# Patient Record
Sex: MALE | Race: WHITE | NOT HISPANIC OR LATINO | Employment: FULL TIME | URBAN - METROPOLITAN AREA
[De-identification: names, ages, dates, MRNs, and addresses within clinical notes are randomized per-mention and may not be internally consistent; named-entity substitution may affect disease eponyms.]

---

## 2018-01-13 NOTE — MISCELLANEOUS
Message  Return to work or school:        Patient had surgery on 7/25/16  He will be seen in the office for his follow up appointment on 8/9/16  Is returning to work on 8/1/16 with lifting restrictions for 4 weeks  No lifting greater than 15 lbs for week 1-2; No lifting greater than 25lbs for week 3-4  Madelyn Reyna MD/cs/lpn        Signatures   Electronically signed by : Jorje Ceja, ; Jul 29 2016 11:09AM EST                       (Author)    Electronically signed by : Madelyn Reyna MD; Aug  2 2016 10:08AM EST                       (Author)

## 2018-01-17 NOTE — MISCELLANEOUS
Message  Return to work or school:   Lynch [place] is under my professional care  He was seen in my office on 08/09/2016   He is able to return to work on  08/22/2016      Justa Narayanan is able to return to work without any restrictions on 8/22/2016          Signatures   Electronically signed by : Parveen Salas OM; Aug 18 2016  9:52AM EST                       (Author)

## 2019-09-23 ENCOUNTER — OFFICE VISIT (OUTPATIENT)
Dept: FAMILY MEDICINE CLINIC | Facility: CLINIC | Age: 54
End: 2019-09-23
Payer: COMMERCIAL

## 2019-09-23 VITALS
WEIGHT: 160.6 LBS | BODY MASS INDEX: 26.76 KG/M2 | HEIGHT: 65 IN | OXYGEN SATURATION: 97 % | HEART RATE: 64 BPM | DIASTOLIC BLOOD PRESSURE: 70 MMHG | SYSTOLIC BLOOD PRESSURE: 110 MMHG

## 2019-09-23 DIAGNOSIS — R39.198 URINARY STREAM SLOWING: ICD-10-CM

## 2019-09-23 DIAGNOSIS — Z13.220 LIPID SCREENING: ICD-10-CM

## 2019-09-23 DIAGNOSIS — Z12.5 SCREENING PSA (PROSTATE SPECIFIC ANTIGEN): ICD-10-CM

## 2019-09-23 DIAGNOSIS — Z00.00 WELL ADULT HEALTH CHECK: Primary | ICD-10-CM

## 2019-09-23 DIAGNOSIS — R19.8 RECTAL DISCHARGE: ICD-10-CM

## 2019-09-23 DIAGNOSIS — Z12.11 COLON CANCER SCREENING: ICD-10-CM

## 2019-09-23 PROCEDURE — 99396 PREV VISIT EST AGE 40-64: CPT | Performed by: FAMILY MEDICINE

## 2019-09-23 NOTE — PATIENT INSTRUCTIONS
Overall healthy 48year-old  He is not up to date with Lipid screening  Cholesterol 2015  183 with HDL 60, , await redo  He is not up to date with Diabetes screening  Await fasting blood work  There is no immunization history on file for this patient  He does not do yearly Flu shot  Tdap/tetanus shot is up to date  (done every 10 yrs for superficial cuts, every 5 yrs for deep wounds)    Rcvd about 1 yr ago w work injury      Was never a smoker  Regarding Colon Cancer screening, we discussed Colonoscopy vs ColoGuard is indicated starting at age 36-53  (Refer to Colorectal to evaluate regarding rectal discharge/ rectal protrusion present x years, also needs screening colonoscopy )    Discussed Prostate Cancer screening pros/cons starting at age 48  Has some urinary slowing daytime w urgency-   No nocturnal c/o  PSA blood test  ordered  Continue to try to watch healthy diet, exercise routinely  Hx Cervical radiculopathy ( LUE)   Continue w stretching/ exercise - call if worsens  We will see him again in 12 months, sooner as needed

## 2019-09-23 NOTE — PROGRESS NOTES
FAMILY PRACTICE OFFICE VISIT  [facility]ial[facility]ILLAN[facility]  [facility]  [id number]   [address] [place], [address]      NAME: Viri Cardenas  AGE: 48 y o  SEX: male  : 1965   MRN: [id number]    DATE: 2019  TIME: 2:22 PM    Assessment and Plan     Problem List Items Addressed This Visit        Other    Chronic Rectal discharge    Relevant Orders    Comprehensive metabolic panel    Ambulatory referral to Colorectal Surgery    CBC    Urinary stream slowing    Relevant Orders    Comprehensive metabolic panel    Urinalysis with microscopic      Other Visit Diagnoses     Well adult health check    -  Primary    Screening PSA (prostate specific antigen)        Relevant Orders    PSA, Total Screen    Colon cancer screening        Relevant Orders    Ambulatory referral to Colorectal Surgery    Lipid screening        Relevant Orders    Lipid panel          Patient Instructions   Overall healthy 48year-old  He is not up to date with Lipid screening  Cholesterol   183 with HDL 60, , await redo  He is not up to date with Diabetes screening  Await fasting blood work  There is no immunization history on file for this patient  He does not do yearly Flu shot  Tdap/tetanus shot is up to date  (done every 10 yrs for superficial cuts, every 5 yrs for deep wounds)    Rcvd about 1 yr ago w work injury      Was never a smoker  Regarding Colon Cancer screening, we discussed Colonoscopy vs ColoGuard is indicated starting at age 36-53  (Refer to Colorectal to evaluate regarding rectal discharge/ rectal protrusion present x years, also needs screening colonoscopy )    Discussed Prostate Cancer screening pros/cons starting at age 48  Has some urinary slowing daytime w urgency-   No nocturnal c/o  PSA blood test  ordered  Continue to try to watch healthy diet, exercise routinely      Hx Cervical radiculopathy ( LUE)   Continue w stretching/ exercise - call if worsens  We will see him again in 12 months, sooner as needed  Chief Complaint     Chief Complaint   Patient presents with    Physical Exam       History of Present Illness   Javier Villavicencio is a 48y o -year-old male who I had last seen back in 2015, he had been having some cervical radicular complaints at that time  He did undergo appendectomy back in 2016  Overall feels well -   Exercises at gym at least weekly-   Very active w deliveries at work     Has noted rectal soilage with protrusion/possible hemorrhoid for many years, uses piece of tissue routinely, has never received prior treatment for this  Review of Systems   Review of Systems   Constitutional: Negative for appetite change, fatigue, fever and unexpected weight change  HENT: Negative for sore throat and trouble swallowing  Respiratory: Negative for cough, chest tightness and shortness of breath  Cardiovascular: Negative for chest pain, palpitations and leg swelling  Gastrointestinal: Negative for abdominal pain and blood in stool  No acid reflux     Has BM regularly - alternates hard to soft for years  Genitourinary: Negative for dysuria and hematuria  Decreased urinary stream with some urgency daytime, no nocturnal complaints  No ED  No dec libido   Musculoskeletal: Positive for back pain (occ low) and neck pain (see 2015 re LUE/ CSpine-  stable recently)  Neurological: Negative for dizziness, syncope, light-headedness and headaches  Hematological: Does not bruise/bleed easily  Psychiatric/Behavioral: Negative for behavioral problems and confusion         Active Problem List     Patient Active Problem List   Diagnosis    Cervical radiculopathy    Chronic Rectal discharge    Urinary stream slowing       Past Medical History:  Reviewed    Past Surgical History:  Reviewed    Family History:  Reviewed    Social History:  Reviewed    Objective     Vitals:    09/23/19 1321   BP: 110/70   BP Location: Left arm   Patient Position: Sitting   Cuff Size: Large   Pulse: 64   SpO2: 97%   Weight: 72 8 kg (160 lb 9 6 oz)   Height: 5' 4 9" (1 648 m)     Body mass index is 26 81 kg/m²  BP Readings from Last 3 Encounters:   09/23/19 110/70   08/09/16 122/72   07/26/16 116/69       Wt Readings from Last 3 Encounters:   09/23/19 72 8 kg (160 lb 9 6 oz)   08/09/16 72 6 kg (160 lb 0 6 oz)   07/25/16 71 kg (156 lb 8 4 oz)       Physical Exam   Constitutional: He is oriented to person, place, and time  He appears well-developed and well-nourished  No distress  HENT:   Mouth/Throat: Oropharynx is clear and moist  No oropharyngeal exudate  TM clear   Eyes: Conjunctivae are normal  No scleral icterus  Cardiovascular: Normal rate, regular rhythm and normal heart sounds  No murmur heard  No carotid bruit   Pulmonary/Chest: Effort normal and breath sounds normal  No respiratory distress  Abdominal: Soft  There is no tenderness  Genitourinary: Prostate normal  Rectal exam shows guaiac negative stool  Genitourinary Comments: Has inflamed red external area rectal   No other mass palpable   Musculoskeletal: He exhibits no edema  Lymphadenopathy:     He has no cervical adenopathy  Neurological: He is alert and oriented to person, place, and time  Psychiatric: He has a normal mood and affect  His behavior is normal        ALLERGIES:  No Known Allergies    Current Medications     No current outpatient medications on file  No current facility-administered medications for this visit               Orders Placed This Encounter   Procedures    Lipid panel    Comprehensive metabolic panel    PSA, Total Screen    Urinalysis with microscopic    CBC    Ambulatory referral to Colorectal Surgery         Gisella France DO

## 2019-10-07 ENCOUNTER — APPOINTMENT (OUTPATIENT)
Dept: LAB | Facility: CLINIC | Age: 54
End: 2019-10-07
Payer: COMMERCIAL

## 2019-10-07 DIAGNOSIS — Z12.5 SCREENING PSA (PROSTATE SPECIFIC ANTIGEN): ICD-10-CM

## 2019-10-07 LAB
ALBUMIN SERPL BCP-MCNC: 3.9 G/DL (ref 3.5–5)
ALP SERPL-CCNC: 74 U/L (ref 46–116)
ALT SERPL W P-5'-P-CCNC: 19 U/L (ref 12–78)
ANION GAP SERPL CALCULATED.3IONS-SCNC: 1 MMOL/L (ref 4–13)
AST SERPL W P-5'-P-CCNC: 17 U/L (ref 5–45)
BACTERIA UR QL AUTO: NORMAL /HPF
BILIRUB SERPL-MCNC: 0.36 MG/DL (ref 0.2–1)
BILIRUB UR QL STRIP: NEGATIVE
BUN SERPL-MCNC: 16 MG/DL (ref 5–25)
CALCIUM SERPL-MCNC: 9.4 MG/DL (ref 8.3–10.1)
CHLORIDE SERPL-SCNC: 107 MMOL/L (ref 100–108)
CHOLEST SERPL-MCNC: 172 MG/DL (ref 50–200)
CLARITY UR: CLEAR
CO2 SERPL-SCNC: 29 MMOL/L (ref 21–32)
COLOR UR: YELLOW
CREAT SERPL-MCNC: 0.94 MG/DL (ref 0.6–1.3)
ERYTHROCYTE [DISTWIDTH] IN BLOOD BY AUTOMATED COUNT: 12.7 % (ref 11.6–15.1)
GFR SERPL CREATININE-BSD FRML MDRD: 93 ML/MIN/1.73SQ M
GLUCOSE P FAST SERPL-MCNC: 94 MG/DL (ref 65–99)
GLUCOSE UR STRIP-MCNC: NEGATIVE MG/DL
HCT VFR BLD AUTO: 46.1 % (ref 36.5–49.3)
HDLC SERPL-MCNC: 55 MG/DL (ref 40–60)
HGB BLD-MCNC: 15.1 G/DL (ref 12–17)
HGB UR QL STRIP.AUTO: NEGATIVE
HYALINE CASTS #/AREA URNS LPF: NORMAL /LPF
KETONES UR STRIP-MCNC: NEGATIVE MG/DL
LDLC SERPL CALC-MCNC: 107 MG/DL (ref 0–100)
LEUKOCYTE ESTERASE UR QL STRIP: NEGATIVE
MCH RBC QN AUTO: 30.6 PG (ref 26.8–34.3)
MCHC RBC AUTO-ENTMCNC: 32.8 G/DL (ref 31.4–37.4)
MCV RBC AUTO: 93 FL (ref 82–98)
NITRITE UR QL STRIP: NEGATIVE
NON-SQ EPI CELLS URNS QL MICRO: NORMAL /HPF
NONHDLC SERPL-MCNC: 117 MG/DL
PH UR STRIP.AUTO: 6.5 [PH]
PLATELET # BLD AUTO: 241 THOUSANDS/UL (ref 149–390)
PMV BLD AUTO: 9.2 FL (ref 8.9–12.7)
POTASSIUM SERPL-SCNC: 4.6 MMOL/L (ref 3.5–5.3)
PROT SERPL-MCNC: 7.5 G/DL (ref 6.4–8.2)
PROT UR STRIP-MCNC: NEGATIVE MG/DL
PSA SERPL-MCNC: 0.7 NG/ML (ref 0–4)
RBC # BLD AUTO: 4.94 MILLION/UL (ref 3.88–5.62)
RBC #/AREA URNS AUTO: NORMAL /HPF
SODIUM SERPL-SCNC: 137 MMOL/L (ref 136–145)
SP GR UR STRIP.AUTO: 1.03 (ref 1–1.03)
TRIGL SERPL-MCNC: 50 MG/DL
UROBILINOGEN UR QL STRIP.AUTO: 0.2 E.U./DL
WBC # BLD AUTO: 4.49 THOUSAND/UL (ref 4.31–10.16)
WBC #/AREA URNS AUTO: NORMAL /HPF

## 2019-10-07 PROCEDURE — 80053 COMPREHEN METABOLIC PANEL: CPT | Performed by: FAMILY MEDICINE

## 2019-10-07 PROCEDURE — 80061 LIPID PANEL: CPT | Performed by: FAMILY MEDICINE

## 2019-10-07 PROCEDURE — G0103 PSA SCREENING: HCPCS

## 2019-10-07 PROCEDURE — 36415 COLL VENOUS BLD VENIPUNCTURE: CPT | Performed by: FAMILY MEDICINE

## 2019-10-07 PROCEDURE — 85027 COMPLETE CBC AUTOMATED: CPT | Performed by: FAMILY MEDICINE

## 2019-10-07 PROCEDURE — 81001 URINALYSIS AUTO W/SCOPE: CPT | Performed by: FAMILY MEDICINE

## 2019-10-28 PROBLEM — K62.9 ANAL LESION: Status: ACTIVE | Noted: 2019-10-28

## 2019-11-15 ENCOUNTER — TELEPHONE (OUTPATIENT)
Dept: GASTROENTEROLOGY | Facility: HOSPITAL | Age: 54
End: 2019-11-15

## 2019-11-17 ENCOUNTER — ANESTHESIA (OUTPATIENT)
Dept: ANESTHESIOLOGY | Facility: HOSPITAL | Age: 54
End: 2019-11-17

## 2019-11-17 ENCOUNTER — ANESTHESIA EVENT (OUTPATIENT)
Dept: GASTROENTEROLOGY | Facility: HOSPITAL | Age: 54
End: 2019-11-17

## 2019-11-17 ENCOUNTER — ANESTHESIA EVENT (OUTPATIENT)
Dept: ANESTHESIOLOGY | Facility: HOSPITAL | Age: 54
End: 2019-11-17

## 2019-11-18 ENCOUNTER — ANESTHESIA (OUTPATIENT)
Dept: GASTROENTEROLOGY | Facility: HOSPITAL | Age: 54
End: 2019-11-18

## 2019-11-18 ENCOUNTER — HOSPITAL ENCOUNTER (OUTPATIENT)
Dept: GASTROENTEROLOGY | Facility: HOSPITAL | Age: 54
Setting detail: OUTPATIENT SURGERY
Discharge: HOME | End: 2019-11-18
Attending: COLON & RECTAL SURGERY | Admitting: COLON & RECTAL SURGERY
Payer: COMMERCIAL

## 2019-11-18 VITALS
DIASTOLIC BLOOD PRESSURE: 69 MMHG | SYSTOLIC BLOOD PRESSURE: 113 MMHG | HEART RATE: 56 BPM | TEMPERATURE: 98.6 F | RESPIRATION RATE: 16 BRPM | OXYGEN SATURATION: 98 %

## 2019-11-18 DIAGNOSIS — R19.8 RECTAL DISCHARGE: ICD-10-CM

## 2019-11-18 PROCEDURE — 45380 COLONOSCOPY AND BIOPSY: CPT | Performed by: COLON & RECTAL SURGERY

## 2019-11-18 PROCEDURE — 88305 TISSUE EXAM BY PATHOLOGIST: CPT | Performed by: PATHOLOGY

## 2019-11-18 RX ORDER — PROPOFOL 10 MG/ML
INJECTION, EMULSION INTRAVENOUS CONTINUOUS PRN
Status: DISCONTINUED | OUTPATIENT
Start: 2019-11-18 | End: 2019-11-18 | Stop reason: SURG

## 2019-11-18 RX ORDER — SODIUM CHLORIDE 9 MG/ML
100 INJECTION, SOLUTION INTRAVENOUS CONTINUOUS
Status: DISCONTINUED | OUTPATIENT
Start: 2019-11-18 | End: 2019-11-22 | Stop reason: HOSPADM

## 2019-11-18 RX ORDER — SODIUM CHLORIDE 9 MG/ML
INJECTION, SOLUTION INTRAVENOUS CONTINUOUS PRN
Status: DISCONTINUED | OUTPATIENT
Start: 2019-11-18 | End: 2019-11-18 | Stop reason: SURG

## 2019-11-18 RX ORDER — PROPOFOL 10 MG/ML
INJECTION, EMULSION INTRAVENOUS AS NEEDED
Status: DISCONTINUED | OUTPATIENT
Start: 2019-11-18 | End: 2019-11-18 | Stop reason: SURG

## 2019-11-18 RX ADMIN — SODIUM CHLORIDE: 9 INJECTION, SOLUTION INTRAVENOUS at 11:21

## 2019-11-18 RX ADMIN — PROPOFOL 80 MG: 10 INJECTION, EMULSION INTRAVENOUS at 11:25

## 2019-11-18 RX ADMIN — SODIUM CHLORIDE 100 ML/HR: 0.9 INJECTION, SOLUTION INTRAVENOUS at 10:45

## 2019-11-18 RX ADMIN — PROPOFOL 80 MCG/KG/MIN: 10 INJECTION, EMULSION INTRAVENOUS at 11:25

## 2019-11-18 RX ADMIN — PROPOFOL 20 MG: 10 INJECTION, EMULSION INTRAVENOUS at 11:30

## 2019-11-18 NOTE — H&P
History and Physical   Colon and Rectal Surgery   Jory Nielsen 48 y o  male MRN: [id number]  Unit/Bed#:  Encounter: [id number]  11/18/19   @NOW    No chief complaint on file  History of Present Illness   HPI:  Jory Nielsen is a 48 y o  male who presents for colorectal cancer screening  No recent exams  Patient has been seen in the outpatient setting for evaluation of mucosal prolapse with ectropion  He is scheduled for surgery later this week  Historical Information   Past Medical History:   Diagnosis Date    Hemorrhoids     Renal calculi      Past Surgical History:   Procedure Laterality Date    APPENDECTOMY      [place] LAP,APPENDECTOMY N/A 7/25/2016    Procedure: APPENDECTOMY LAPAROSCOPIC;  Surgeon: Bernardo Stiles MD;  Location: [facility];  Service: General       Meds/Allergies       (Not in a hospital admission)    No current outpatient medications on file  No Known Allergies      Social History   Social History     Substance and Sexual Activity   Alcohol Use No     Social History     Substance and Sexual Activity   Drug Use No     Social History     Tobacco Use   Smoking Status Never Smoker   Smokeless Tobacco Never Used         Family History:   Family History   Problem Relation Age of Onset    Cancer Paternal Grandfather         Unknown origin     Other Family         Back problem     Lung cancer Father     Cancer Maternal Grandfather         unknown origin         Objective     Current Vitals:   Blood Pressure: 138/81 (11/18/19 1032)  Pulse: 69 (11/18/19 1032)  Temperature: 98 6 °F (37 °C) (11/18/19 1032)  Temp Source: Oral (11/18/19 1032)  Respirations: 16 (11/18/19 1032)  SpO2: 97 % (11/18/19 1032)  No intake or output data in the 24 hours ending 11/18/19 1044    Physical Exam:  General:  Resting comfortably in bed   Eyes:Sclera anicteric  ENT: Trachea midline  Pulm:  Symmetric chest raise    No respiratory Distress  CV:  Regular on monitor  Abdomen:  Soft NT ND  Extremities:  No clubbing/ cyanosis/ edema    Lab Results: I have personally reviewed pertinent lab results  Imaging: I have personally reviewed pertinent reports  ASSESSMENT:  Luz Maria Trejo is a 48 y o  male who presents for outpatient colonoscopy  PLAN:  For colonoscopy    Risks/ Benefits reviewed to include but not limited to anesthesia, bleeding, missed lesions, and colonoscopic perforation requiring surgery

## 2019-11-18 NOTE — ANESTHESIA PREPROCEDURE EVALUATION
Review of Systems/Medical History  Patient summary reviewed  Chart reviewed  No history of anesthetic complications     Cardiovascular  Negative cardio ROS Exercise tolerance (METS): >4,     Pulmonary  Negative pulmonary ROS        GI/Hepatic    Bowel prep       Negative  ROS        Endo/Other  Negative endo/other ROS      GYN       Hematology  Negative hematology ROS      Musculoskeletal  Negative musculoskeletal ROS        Neurology  Negative neurology ROS      Psychology   Negative psychology ROS              Physical Exam    Airway    Mallampati score: I  TM Distance: >3 FB  Neck ROM: full     Dental   No notable dental hx     Cardiovascular  Comment: Negative ROS,     Pulmonary      Other Findings        Anesthesia Plan  ASA Score- 1     Anesthesia Type- IV sedation with anesthesia with ASA Monitors  Additional Monitors:   Airway Plan:         Plan Factors-    Induction- intravenous  Postoperative Plan-     Informed Consent- Anesthetic plan and risks discussed with patient and spouse  I personally reviewed this patient with the CRNA  Discussed and agreed on the Anesthesia Plan with the CRNA  Aleksandar Loza

## 2019-11-18 NOTE — ANESTHESIA POSTPROCEDURE EVALUATION
Post-Op Assessment Note    CV Status:  Stable    Pain management: adequate     Mental Status:  Alert and awake   Hydration Status:  Euvolemic   PONV Controlled:  Controlled   Airway Patency:  Patent   Post Op Vitals Reviewed: Yes      Staff: Anesthesiologist, CRNA           /73 (11/18/19 1149)    Temp      Pulse 73 (11/18/19 1149)   Resp 18 (11/18/19 1149)    SpO2 96 % (11/18/19 1149)

## 2019-11-18 NOTE — H&P (VIEW-ONLY)
History and Physical   Colon and Rectal Surgery   Efrem Connell 48 y o  male MRN: [id number]  Unit/Bed#:  Encounter: [id number]  11/18/19   @NOW    No chief complaint on file  History of Present Illness   HPI:  Efrem Connell is a 48 y o  male who presents for colorectal cancer screening  No recent exams  Patient has been seen in the outpatient setting for evaluation of mucosal prolapse with ectropion  He is scheduled for surgery later this week  Historical Information   Past Medical History:   Diagnosis Date    Hemorrhoids     Renal calculi      Past Surgical History:   Procedure Laterality Date    APPENDECTOMY      [place] LAP,APPENDECTOMY N/A 7/25/2016    Procedure: APPENDECTOMY LAPAROSCOPIC;  Surgeon: Jessica tS MD;  Location: [facility];  Service: General       Meds/Allergies       (Not in a hospital admission)    No current outpatient medications on file  No Known Allergies      Social History   Social History     Substance and Sexual Activity   Alcohol Use No     Social History     Substance and Sexual Activity   Drug Use No     Social History     Tobacco Use   Smoking Status Never Smoker   Smokeless Tobacco Never Used         Family History:   Family History   Problem Relation Age of Onset    Cancer Paternal Grandfather         Unknown origin     Other Family         Back problem     Lung cancer Father     Cancer Maternal Grandfather         unknown origin         Objective     Current Vitals:   Blood Pressure: 138/81 (11/18/19 1032)  Pulse: 69 (11/18/19 1032)  Temperature: 98 6 °F (37 °C) (11/18/19 1032)  Temp Source: Oral (11/18/19 1032)  Respirations: 16 (11/18/19 1032)  SpO2: 97 % (11/18/19 1032)  No intake or output data in the 24 hours ending 11/18/19 1044    Physical Exam:  General:  Resting comfortably in bed   Eyes:Sclera anicteric  ENT: Trachea midline  Pulm:  Symmetric chest raise    No respiratory Distress  CV:  Regular on monitor  Abdomen:  Soft NT ND  Extremities:  No clubbing/ cyanosis/ edema    Lab Results: I have personally reviewed pertinent lab results  Imaging: I have personally reviewed pertinent reports  ASSESSMENT:  Mandy Carnes is a 48 y o  male who presents for outpatient colonoscopy  PLAN:  For colonoscopy    Risks/ Benefits reviewed to include but not limited to anesthesia, bleeding, missed lesions, and colonoscopic perforation requiring surgery

## 2019-11-20 ENCOUNTER — ANESTHESIA EVENT (OUTPATIENT)
Dept: PERIOP | Facility: HOSPITAL | Age: 54
End: 2019-11-20
Payer: COMMERCIAL

## 2019-11-20 ENCOUNTER — HOSPITAL ENCOUNTER (OUTPATIENT)
Facility: HOSPITAL | Age: 54
Setting detail: OUTPATIENT SURGERY
Discharge: HOME | End: 2019-11-20
Attending: COLON & RECTAL SURGERY | Admitting: COLON & RECTAL SURGERY
Payer: COMMERCIAL

## 2019-11-20 ENCOUNTER — ANESTHESIA (OUTPATIENT)
Dept: PERIOP | Facility: HOSPITAL | Age: 54
End: 2019-11-20
Payer: COMMERCIAL

## 2019-11-20 VITALS
SYSTOLIC BLOOD PRESSURE: 142 MMHG | DIASTOLIC BLOOD PRESSURE: 76 MMHG | TEMPERATURE: 97.7 F | HEART RATE: 68 BPM | RESPIRATION RATE: 18 BRPM | BODY MASS INDEX: 27.49 KG/M2 | WEIGHT: 165 LBS | OXYGEN SATURATION: 99 % | HEIGHT: 65 IN

## 2019-11-20 DIAGNOSIS — R19.8 RECTAL DISCHARGE: Primary | ICD-10-CM

## 2019-11-20 DIAGNOSIS — R19.8 RECTAL DISCHARGE: ICD-10-CM

## 2019-11-20 PROCEDURE — C9290 INJ, BUPIVACAINE LIPOSOME: HCPCS | Performed by: COLON & RECTAL SURGERY

## 2019-11-20 PROCEDURE — 88304 TISSUE EXAM BY PATHOLOGIST: CPT | Performed by: PATHOLOGY

## 2019-11-20 PROCEDURE — 46255 REMOVE INT/EXT HEM 1 GROUP: CPT | Performed by: COLON & RECTAL SURGERY

## 2019-11-20 PROCEDURE — C1765 ADHESION BARRIER: HCPCS | Performed by: COLON & RECTAL SURGERY

## 2019-11-20 RX ORDER — MIDAZOLAM HYDROCHLORIDE 2 MG/2ML
INJECTION, SOLUTION INTRAMUSCULAR; INTRAVENOUS AS NEEDED
Status: DISCONTINUED | OUTPATIENT
Start: 2019-11-20 | End: 2019-11-20 | Stop reason: SURG

## 2019-11-20 RX ORDER — HYDROMORPHONE HCL/PF 1 MG/ML
0.2 SYRINGE (ML) INJECTION
Status: DISCONTINUED | OUTPATIENT
Start: 2019-11-20 | End: 2019-11-20 | Stop reason: HOSPADM

## 2019-11-20 RX ORDER — PROPOFOL 10 MG/ML
INJECTION, EMULSION INTRAVENOUS AS NEEDED
Status: DISCONTINUED | OUTPATIENT
Start: 2019-11-20 | End: 2019-11-20 | Stop reason: SURG

## 2019-11-20 RX ORDER — OXYCODONE HYDROCHLORIDE 5 MG/1
5 TABLET ORAL EVERY 4 HOURS PRN
Qty: 30 TABLET | Refills: 0 | Status: SHIPPED | OUTPATIENT
Start: 2019-11-20 | End: 2019-11-30

## 2019-11-20 RX ORDER — DEXAMETHASONE SODIUM PHOSPHATE 10 MG/ML
INJECTION, SOLUTION INTRAMUSCULAR; INTRAVENOUS AS NEEDED
Status: DISCONTINUED | OUTPATIENT
Start: 2019-11-20 | End: 2019-11-20 | Stop reason: SURG

## 2019-11-20 RX ORDER — BUPIVACAINE HYDROCHLORIDE AND EPINEPHRINE 2.5; 5 MG/ML; UG/ML
INJECTION, SOLUTION EPIDURAL; INFILTRATION; INTRACAUDAL; PERINEURAL AS NEEDED
Status: DISCONTINUED | OUTPATIENT
Start: 2019-11-20 | End: 2019-11-20 | Stop reason: HOSPADM

## 2019-11-20 RX ORDER — ONDANSETRON 2 MG/ML
INJECTION INTRAMUSCULAR; INTRAVENOUS AS NEEDED
Status: DISCONTINUED | OUTPATIENT
Start: 2019-11-20 | End: 2019-11-20 | Stop reason: SURG

## 2019-11-20 RX ORDER — SODIUM CHLORIDE, SODIUM LACTATE, POTASSIUM CHLORIDE, CALCIUM CHLORIDE 600; 310; 30; 20 MG/100ML; MG/100ML; MG/100ML; MG/100ML
INJECTION, SOLUTION INTRAVENOUS CONTINUOUS PRN
Status: DISCONTINUED | OUTPATIENT
Start: 2019-11-20 | End: 2019-11-20

## 2019-11-20 RX ORDER — LIDOCAINE HYDROCHLORIDE 10 MG/ML
INJECTION, SOLUTION INFILTRATION; PERINEURAL AS NEEDED
Status: DISCONTINUED | OUTPATIENT
Start: 2019-11-20 | End: 2019-11-20 | Stop reason: SURG

## 2019-11-20 RX ORDER — SODIUM CHLORIDE, SODIUM LACTATE, POTASSIUM CHLORIDE, CALCIUM CHLORIDE 600; 310; 30; 20 MG/100ML; MG/100ML; MG/100ML; MG/100ML
75 INJECTION, SOLUTION INTRAVENOUS CONTINUOUS
Status: DISCONTINUED | OUTPATIENT
Start: 2019-11-20 | End: 2019-11-20 | Stop reason: HOSPADM

## 2019-11-20 RX ORDER — ONDANSETRON 2 MG/ML
4 INJECTION INTRAMUSCULAR; INTRAVENOUS ONCE AS NEEDED
Status: DISCONTINUED | OUTPATIENT
Start: 2019-11-20 | End: 2019-11-20 | Stop reason: HOSPADM

## 2019-11-20 RX ORDER — SUCCINYLCHOLINE/SOD CL,ISO/PF 100 MG/5ML
SYRINGE (ML) INTRAVENOUS AS NEEDED
Status: DISCONTINUED | OUTPATIENT
Start: 2019-11-20 | End: 2019-11-20 | Stop reason: SURG

## 2019-11-20 RX ORDER — FENTANYL CITRATE 50 UG/ML
INJECTION, SOLUTION INTRAMUSCULAR; INTRAVENOUS AS NEEDED
Status: DISCONTINUED | OUTPATIENT
Start: 2019-11-20 | End: 2019-11-20 | Stop reason: SURG

## 2019-11-20 RX ORDER — FENTANYL CITRATE/PF 50 MCG/ML
25 SYRINGE (ML) INJECTION
Status: DISCONTINUED | OUTPATIENT
Start: 2019-11-20 | End: 2019-11-20 | Stop reason: HOSPADM

## 2019-11-20 RX ORDER — OXYCODONE HYDROCHLORIDE 5 MG/1
5 TABLET ORAL EVERY 4 HOURS PRN
Status: DISCONTINUED | OUTPATIENT
Start: 2019-11-20 | End: 2019-11-20 | Stop reason: HOSPADM

## 2019-11-20 RX ADMIN — FENTANYL CITRATE 50 MCG: 50 INJECTION, SOLUTION INTRAMUSCULAR; INTRAVENOUS at 09:39

## 2019-11-20 RX ADMIN — MIDAZOLAM 2 MG: 1 INJECTION INTRAMUSCULAR; INTRAVENOUS at 09:31

## 2019-11-20 RX ADMIN — LIDOCAINE HYDROCHLORIDE 50 MG: 10 INJECTION, SOLUTION INFILTRATION; PERINEURAL at 09:39

## 2019-11-20 RX ADMIN — PROPOFOL 150 MG: 10 INJECTION, EMULSION INTRAVENOUS at 09:39

## 2019-11-20 RX ADMIN — DEXAMETHASONE SODIUM PHOSPHATE 10 MG: 10 INJECTION, SOLUTION INTRAMUSCULAR; INTRAVENOUS at 09:56

## 2019-11-20 RX ADMIN — FENTANYL CITRATE 50 MCG: 50 INJECTION, SOLUTION INTRAMUSCULAR; INTRAVENOUS at 09:54

## 2019-11-20 RX ADMIN — SODIUM CHLORIDE, SODIUM LACTATE, POTASSIUM CHLORIDE, AND CALCIUM CHLORIDE: .6; .31; .03; .02 INJECTION, SOLUTION INTRAVENOUS at 09:30

## 2019-11-20 RX ADMIN — ONDANSETRON 4 MG: 2 INJECTION INTRAMUSCULAR; INTRAVENOUS at 09:57

## 2019-11-20 RX ADMIN — Medication 100 MG: at 09:40

## 2019-11-20 NOTE — OP NOTE
OPERATIVE REPORT  PATIENT NAME: Niya Hutton    :  1965  MRN: [id number]  Pt Location: [facility] OR ROOM 06    SURGERY DATE: 2019    Surgeon(s) and Role:     * Rubens Billy MD - Primary     * Maria Teresa Trevizo MD - Assisting    Preop Diagnosis:  Rectal discharge [R19 8]    Post-Op Diagnosis Codes:     * Rectal discharge [R19 8]    Procedure(s) (LRB):  EXAM UNDER ANESTHESIA (EUA) excision of mucosal ectropion (N/A)    Specimen(s):  ID Type Source Tests Collected by Time Destination   1 : Mucosal Ectropian Tissue Hemorrhoids TISSUE EXAM Rubens Billy MD 2019 0919        Estimated Blood Loss:   Minimal    Drains:  * No LDAs found *    Anesthesia Type:   General    Operative Indications:  Rectal discharge [R19 8]      Operative Findings:  Right-sided mucosal ectropion    Complications:   None    Procedure and Technique:  After preoperative identification in the preoperative holding area, the patient was taken the operating room placed in the supine position  Following introduction of general anesthesia the patient was in place in the prone jackknife position with buttocks taped apart  Patient was prepped and draped in usual sterile fashion  Timeout was undertaken and procedure begun  Examination was performed  In the right lateral position mucosa with clearly visible with prolapse  The hemorrhoidal pedicle as per quite normal   The shaped incision was made in the perianal skin  Submucosal dissection was undertaken  At the apex 0 Vicryl was used to secure this area  Mucosa was excised  The defect was closed using running locking 3 0 Vicryl  Hemostasis noted be excellent  Local field block using 40 cc of Exparel and bupivacaine was placed  Gelfoam was placed within the anal canal  Patient was awakened from anesthesia and transferred to recovery room in stable condition     I was present for the entire procedure    Patient Disposition:  PACU     SIGNATURE: Rubens Billy MD  DATE: November 20, 2019  TIME: 10:05 AM

## 2019-11-20 NOTE — ANESTHESIA PREPROCEDURE EVALUATION
Review of Systems/Medical History  Patient summary reviewed        Cardiovascular  Negative cardio ROS Exercise tolerance (METS): >4,     Pulmonary  Negative pulmonary ROS        GI/Hepatic  Negative GI/hepatic ROS     Comment: hemorhoids     Negative  ROS        Endo/Other  Negative endo/other ROS      GYN       Hematology  Negative hematology ROS      Musculoskeletal  Negative musculoskeletal ROS        Neurology  Negative neurology ROS      Psychology   Negative psychology ROS              Physical Exam    Airway    Mallampati score: II  TM Distance: >3 FB  Neck ROM: full     Dental   No notable dental hx     Cardiovascular  Comment: Negative ROS, Rhythm: regular, Rate: normal,     Pulmonary  Breath sounds clear to auscultation,     Other Findings        Anesthesia Plan  ASA Score- 2     Anesthesia Type- general with ASA Monitors  Additional Monitors:   Airway Plan: ETT and LMA  Plan Factors-  Patient did not smoke on day of surgery  Induction- intravenous  Postoperative Plan- Plan for postoperative opioid use  Informed Consent- Anesthetic plan and risks discussed with patient  I personally reviewed this patient with the CRNA  Discussed and agreed on the Anesthesia Plan with the CRNA  Jessika Console

## 2019-11-20 NOTE — INTERVAL H&P NOTE
H&P reviewed  After examining the patient I find no changes in the patients condition since the H&P had been written  Presents for care mucosal ectropion  Plan for excision of mucosal ectropion under anesthesia  Risks of anal surgery, including but not limited to pain, bleeding, failure to heal properly, fecal incontinence, persistent or recurrent anal disease, infection, fistula, abscess were reviewed  Questions were answered  There were no vitals filed for this visit

## 2019-11-20 NOTE — ANESTHESIA POSTPROCEDURE EVALUATION
Post-Op Assessment Note    CV Status:  Stable  Pain Score: 0    Pain management: adequate     Mental Status:  Alert and awake   Hydration Status:  Euvolemic   PONV Controlled:  Controlled   Airway Patency:  Patent   Post Op Vitals Reviewed: Yes      Staff: CRNA           BP   130/74   Temp  97 2   Pulse  67   Resp   18   SpO2   99%

## 2022-07-18 ENCOUNTER — OFFICE VISIT (OUTPATIENT)
Dept: FAMILY MEDICINE CLINIC | Facility: CLINIC | Age: 57
End: 2022-07-18
Payer: COMMERCIAL

## 2022-07-18 ENCOUNTER — APPOINTMENT (OUTPATIENT)
Dept: LAB | Facility: CLINIC | Age: 57
End: 2022-07-18
Payer: COMMERCIAL

## 2022-07-18 VITALS
RESPIRATION RATE: 12 BRPM | HEART RATE: 62 BPM | BODY MASS INDEX: 26.29 KG/M2 | HEIGHT: 66 IN | WEIGHT: 163.6 LBS | DIASTOLIC BLOOD PRESSURE: 80 MMHG | SYSTOLIC BLOOD PRESSURE: 132 MMHG | OXYGEN SATURATION: 98 %

## 2022-07-18 DIAGNOSIS — Z12.5 PROSTATE CANCER SCREENING: ICD-10-CM

## 2022-07-18 DIAGNOSIS — Z13.220 LIPID SCREENING: ICD-10-CM

## 2022-07-18 DIAGNOSIS — M46.1 SACROILIAC INFLAMMATION (HCC): Primary | ICD-10-CM

## 2022-07-18 DIAGNOSIS — M54.12 CERVICAL RADICULOPATHY: ICD-10-CM

## 2022-07-18 PROBLEM — K62.9 ANAL LESION: Status: RESOLVED | Noted: 2019-10-28 | Resolved: 2022-07-18

## 2022-07-18 PROBLEM — R19.8 RECTAL DISCHARGE: Status: RESOLVED | Noted: 2019-09-23 | Resolved: 2022-07-18

## 2022-07-18 LAB
ALBUMIN SERPL BCP-MCNC: 4.1 G/DL (ref 3.5–5)
ALP SERPL-CCNC: 72 U/L (ref 46–116)
ALT SERPL W P-5'-P-CCNC: 18 U/L (ref 12–78)
ANION GAP SERPL CALCULATED.3IONS-SCNC: 3 MMOL/L (ref 4–13)
AST SERPL W P-5'-P-CCNC: 19 U/L (ref 5–45)
BASOPHILS # BLD AUTO: 0.05 THOUSANDS/ΜL (ref 0–0.1)
BASOPHILS NFR BLD AUTO: 1 % (ref 0–1)
BILIRUB SERPL-MCNC: 0.51 MG/DL (ref 0.2–1)
BUN SERPL-MCNC: 21 MG/DL (ref 5–25)
CALCIUM SERPL-MCNC: 9.6 MG/DL (ref 8.3–10.1)
CHLORIDE SERPL-SCNC: 108 MMOL/L (ref 96–108)
CHOLEST SERPL-MCNC: 185 MG/DL
CO2 SERPL-SCNC: 29 MMOL/L (ref 21–32)
CREAT SERPL-MCNC: 1.05 MG/DL (ref 0.6–1.3)
EOSINOPHIL # BLD AUTO: 0.11 THOUSAND/ΜL (ref 0–0.61)
EOSINOPHIL NFR BLD AUTO: 2 % (ref 0–6)
ERYTHROCYTE [DISTWIDTH] IN BLOOD BY AUTOMATED COUNT: 12.7 % (ref 11.6–15.1)
GFR SERPL CREATININE-BSD FRML MDRD: 80 ML/MIN/1.73SQ M
GLUCOSE P FAST SERPL-MCNC: 101 MG/DL (ref 65–99)
HCT VFR BLD AUTO: 46.5 % (ref 36.5–49.3)
HDLC SERPL-MCNC: 56 MG/DL
HGB BLD-MCNC: 15.2 G/DL (ref 12–17)
IMM GRANULOCYTES # BLD AUTO: 0.02 THOUSAND/UL (ref 0–0.2)
IMM GRANULOCYTES NFR BLD AUTO: 0 % (ref 0–2)
LDLC SERPL CALC-MCNC: 116 MG/DL (ref 0–100)
LYMPHOCYTES # BLD AUTO: 1.53 THOUSANDS/ΜL (ref 0.6–4.47)
LYMPHOCYTES NFR BLD AUTO: 30 % (ref 14–44)
MCH RBC QN AUTO: 30.9 PG (ref 26.8–34.3)
MCHC RBC AUTO-ENTMCNC: 32.7 G/DL (ref 31.4–37.4)
MCV RBC AUTO: 95 FL (ref 82–98)
MONOCYTES # BLD AUTO: 0.67 THOUSAND/ΜL (ref 0.17–1.22)
MONOCYTES NFR BLD AUTO: 13 % (ref 4–12)
NEUTROPHILS # BLD AUTO: 2.71 THOUSANDS/ΜL (ref 1.85–7.62)
NEUTS SEG NFR BLD AUTO: 54 % (ref 43–75)
NRBC BLD AUTO-RTO: 0 /100 WBCS
PLATELET # BLD AUTO: 257 THOUSANDS/UL (ref 149–390)
PMV BLD AUTO: 9.3 FL (ref 8.9–12.7)
POTASSIUM SERPL-SCNC: 4.6 MMOL/L (ref 3.5–5.3)
PROT SERPL-MCNC: 8 G/DL (ref 6.4–8.4)
PSA SERPL-MCNC: 0.9 NG/ML (ref 0–4)
RBC # BLD AUTO: 4.92 MILLION/UL (ref 3.88–5.62)
SODIUM SERPL-SCNC: 140 MMOL/L (ref 135–147)
TRIGL SERPL-MCNC: 63 MG/DL
WBC # BLD AUTO: 5.09 THOUSAND/UL (ref 4.31–10.16)

## 2022-07-18 PROCEDURE — 36415 COLL VENOUS BLD VENIPUNCTURE: CPT

## 2022-07-18 PROCEDURE — 80061 LIPID PANEL: CPT

## 2022-07-18 PROCEDURE — G0103 PSA SCREENING: HCPCS

## 2022-07-18 PROCEDURE — 3725F SCREEN DEPRESSION PERFORMED: CPT | Performed by: FAMILY MEDICINE

## 2022-07-18 PROCEDURE — 85025 COMPLETE CBC W/AUTO DIFF WBC: CPT

## 2022-07-18 PROCEDURE — 80053 COMPREHEN METABOLIC PANEL: CPT

## 2022-07-18 PROCEDURE — 99214 OFFICE O/P EST MOD 30 MIN: CPT | Performed by: FAMILY MEDICINE

## 2022-07-18 RX ORDER — METHYLPREDNISOLONE 4 MG/1
TABLET ORAL
Qty: 21 TABLET | Refills: 0 | Status: SHIPPED | OUTPATIENT
Start: 2022-07-18 | End: 2022-07-23 | Stop reason: ALTCHOICE

## 2022-07-18 NOTE — PROGRESS NOTES
Assessment/Plan:       Problem List Items Addressed This Visit        Nervous and Auditory    Cervical radiculopathy     He has noted some chronic left triceps muscle weakness as well as some atrophy of his left pectoralis muscle  He is interested in potentially moving forward with some type of permanent fix such as surgery  He does have chronic intermittent pain  Repeat MRI to see where we are with his known lesion  Consider Neurosurgery evaluation  Relevant Orders    Ambulatory referral to Physical Therapy    MRI cervical spine wo contrast    CBC and differential    Comprehensive metabolic panel       Musculoskeletal and Integument    Sacroiliac inflammation (HCC) - Primary     He has some significant pain along his proximal right SI joint  I am going to place him on a Medrol Dosepak and start some physical therapy  He has already been trying essentially prescription strength Aleve with only intermittent relief  Once his steroid pack finishes, he may certainly continue with to leave twice daily as needed  Relevant Medications    methylPREDNISolone 4 MG tablet therapy pack    Other Relevant Orders    Ambulatory referral to Physical Therapy      Other Visit Diagnoses     Lipid screening        Relevant Orders    Comprehensive metabolic panel    Lipid Panel with Direct LDL reflex    Prostate cancer screening        Relevant Orders    PSA, Total Screen            Subjective:      Patient ID: Isra Jensen is a 49 y o  male  HPI patient presents today for with a 3 history of right hip and lower back pain  He notes no acute injury but 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  Pain is worse with weight-bearing and also 1st thing in the morning  He has had some relief with 2 Aleve twice daily as needed  He also has some chronic issues with neck pain  He has a known cervical radiculopathy    He has noted some intermittent sensation of weakness of his left triceps as well as some muscle atrophy  He had previously been treated with epidurals as well as physical therapy but decided to hold off on surgery  The following portions of the patient's history were reviewed and updated as appropriate: allergies, current medications, past family history, past medical history, past social history, past surgical history and problem list       Current Outpatient Medications:     methylPREDNISolone 4 MG tablet therapy pack, Use as directed on package, Disp: 21 tablet, Rfl: 0     Review of Systems   Constitutional: Negative for appetite change, chills, fatigue, fever and unexpected weight change  HENT: Negative for trouble swallowing  Eyes: Negative for visual disturbance  Respiratory: Negative for cough, chest tightness, shortness of breath and wheezing  Cardiovascular: Negative for chest pain, palpitations and leg swelling  Gastrointestinal: Negative for abdominal distention, abdominal pain, blood in stool, constipation and diarrhea  Endocrine: Negative for polyuria  Genitourinary: Negative for difficulty urinating and flank pain  Musculoskeletal: Positive for arthralgias, neck pain and neck stiffness  Negative for myalgias  Skin: Negative for rash  Neurological: Positive for weakness (Left triceps)  Negative for dizziness and light-headedness  Hematological: Negative for adenopathy  Does not bruise/bleed easily  Psychiatric/Behavioral: Negative for dysphoric mood and sleep disturbance  The patient is not nervous/anxious  Objective:      /80 (BP Location: Left arm, Patient Position: Sitting, Cuff Size: Standard)   Pulse 62   Resp 12   Ht 5' 6" (1 676 m)   Wt 74 2 kg (163 lb 9 6 oz)   SpO2 98%   BMI 26 41 kg/m²          Physical Exam  Vitals reviewed  Constitutional:       Appearance: He is well-developed  HENT:      Head: Normocephalic  Cardiovascular:      Rate and Rhythm: Regular rhythm  Heart sounds: Normal heart sounds  No murmur heard  Pulmonary:      Effort: No respiratory distress  Breath sounds: No wheezing or rales  Abdominal:      General: There is no distension  Tenderness: There is no abdominal tenderness  Musculoskeletal:         General: Tenderness (Significant pain along right proximal SI joint) present  Right lower leg: No edema  Left lower leg: No edema  Skin:     Findings: No erythema or rash  Neurological:      Mental Status: He is alert and oriented to person, place, and time             Dipika Light MD

## 2022-07-18 NOTE — ASSESSMENT & PLAN NOTE
He has noted some chronic left triceps muscle weakness as well as some atrophy of his left pectoralis muscle  He is interested in potentially moving forward with some type of permanent fix such as surgery  He does have chronic intermittent pain  Repeat MRI to see where we are with his known lesion  Consider Neurosurgery evaluation

## 2022-07-18 NOTE — ASSESSMENT & PLAN NOTE
He has some significant pain along his proximal right SI joint  I am going to place him on a Medrol Dosepak and start some physical therapy  He has already been trying essentially prescription strength Aleve with only intermittent relief  Once his steroid pack finishes, he may certainly continue with to leave twice daily as needed

## 2022-07-18 NOTE — PATIENT INSTRUCTIONS
Cervical radiculopathy  He has noted some chronic left triceps muscle weakness as well as some atrophy of his left pectoralis muscle  He is interested in potentially moving forward with some type of permanent fix such as surgery  He does have chronic intermittent pain  Repeat MRI to see where we are with his known lesion  Consider Neurosurgery evaluation  Sacroiliac inflammation (HCC)  He has some significant pain along his proximal right SI joint  I am going to place him on a Medrol Dosepak and start some physical therapy  He has already been trying essentially prescription strength Aleve with only intermittent relief  Once his steroid pack finishes, he may certainly continue with to leave twice daily as needed

## 2022-07-27 ENCOUNTER — EVALUATION (OUTPATIENT)
Dept: PHYSICAL THERAPY | Facility: MEDICAL CENTER | Age: 57
End: 2022-07-27
Payer: COMMERCIAL

## 2022-07-27 DIAGNOSIS — M54.12 CERVICAL RADICULOPATHY: ICD-10-CM

## 2022-07-27 DIAGNOSIS — M46.1 SACROILIAC INFLAMMATION (HCC): ICD-10-CM

## 2022-07-27 PROCEDURE — 97161 PT EVAL LOW COMPLEX 20 MIN: CPT | Performed by: PHYSICAL THERAPIST

## 2022-07-27 PROCEDURE — 97110 THERAPEUTIC EXERCISES: CPT | Performed by: PHYSICAL THERAPIST

## 2022-07-27 NOTE — PROGRESS NOTES
PT Evaluation     Today's date: 2022  Patient name: Lorrie Bee  : 1965  MRN: [id number]  Referring provider: Julisa Fournier , *  Dx:   Encounter Diagnosis     ICD-10-CM    1  Sacroiliac inflammation (HCC)  M46 1 Ambulatory referral to Physical Therapy   2  Cervical radiculopathy  M54 12 Ambulatory referral to Physical Therapy       Start Time: 1130  Stop Time: 1230  Total time in clinic (min): 60 minutes    Assessment  Assessment details: Lorrie Bee is a 49 y o  male was evaluated on 2022  for Sacroiliac inflammation (HCC)  Cervical radiculopathy  Lorrie Bee has the below listed impairments resulting in functional deficits and negative impact to quality of life  Patient is appropriate for skilled PT intervention to promote maximal return to function and patient specific goals  Patient agrees with outlined treatment plan and all questions were answered to their satisfaction        Impairments: abnormal or restricted ROM, impaired physical strength and lacks appropriate home exercise program  Functional limitations: Lying flat on back, 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, 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, prolonged walking/standing  Symptom irritability: lowUnderstanding of Dx/Px/POC: good   Prognosis: good    Goals  Short Term:  - Fernanda Crawford will demonstrate a decrease in pain in the hip at worst from 6/10 to -2/10  - Fernanda Crawford will be able to return to his home exercise routine without limitation due to pain  Long Term  - 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 without exacerbation of pain symptoms in the hip  - Reanna Turner will be able to lift deliveries from his truck without limitation due to pain    Plan  Patient would benefit from: skilled physical therapy  Planned modality interventions: traction  Planned therapy interventions: manual therapy, joint mobilization, neuromuscular re-education, therapeutic activities, therapeutic exercise, home exercise program, functional ROM exercises, strengthening and stretching  Frequency: 2x week  Duration in visits: 16  Duration in weeks: 8  Plan of Care beginning date: 2022  Plan of Care expiration date: 2022  Treatment plan discussed with: patient        Subjective Evaluation    History of Present Illness  Date of onset: 2022  Mechanism of injury: Sulma Sim is a 49 y o male presenting to PT with chief complaints of pain in the R hip/SI joint area as well as reports of newer weakness in the L triceps along with atrophy of the L pec and numbness in the L middle finger  Sulma Sim has a 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) with past treatments of steroid injections and PT without significant improvement in symptoms  In regards to the hip, he notes the pain began about 4 weeks ago and has remained in the hip/SI area without radiating with increased LB stiffness first thing in the morning (that loosens through out the day) and more soreness at the end of the day  He notes increased pain with lying down and with prolonged WB on the R LE  He works as a  and has been experiencing discomfort getting in and out of his vehicle and moving deliveries from the truck  He mentions being on a steroid pack that finished this past weekend which has helped reduce the irritability in the hip compared to once it first started, but it still persists            Not a recurrent problem   Quality of life: good    Pain  Current pain ratin  At best pain ratin  At worst pain ratin  Location: R hip along SI joint/surrounding musculature  Quality: sharp and dull ache  Relieving factors: change in position and medications  Aggravating factors: sitting, standing, lifting and walking  Progression: no change    Treatments  Previous treatment: injection treatment, massage, chiropractic, physical therapy and medication  Patient Goals  Patient goals for therapy: decreased pain, increased motion and independence with ADLs/IADLs          Objective     Concurrent Complaints  Negative for night pain and disturbed sleep    Postural Observations  Seated posture: good  Standing posture: good        Palpation   Left   Hypertonic in the lumbar paraspinals  Right   Hypertonic in the lumbar paraspinals  Tenderness     Left Hip   No tenderness in the ASIS and PSIS  Right Hip   Tenderness in the PSIS  No tenderness in the ASIS       Additional Tenderness Details  TTP noted localized along the R SI joint/perisacral musculature     Neurological Testing     Sensation   Cervical/Thoracic   Left   Diminished: light touch    Right   Intact: light touch    Reflexes   Left   Biceps (C5/C6): normal (2+)  Brachioradialis (C6): trace (1+)  Triceps (C7): absent (0)    Right   Biceps (C5/C6): normal (2+)  Brachioradialis (C6): trace (1+)  Triceps (C7): absent (0)    Additional Neurological Details  Diminished light touch in the L middle finger along the C7 dermatomal distribution    Active Range of Motion   Cervical/Thoracic Spine       Cervical    Flexion:  WFL  Extension:  with pain Restriction level: minimal  Left lateral flexion:  Restriction level: minimal  Right lateral flexion:  with pain Restriction level minimal  Left rotation:  Restriction level: minimal  Right rotation:  with pain Restriction level: minimal    Thoracic    Left rotation:  Restriction level: minimal  Right rotation:  Restriction level: minimal    Lumbar   Flexion:  WFL  Extension:  WFL and with pain  Left lateral flexion:  with pain Restriction level: moderate  Right lateral flexion:  Restriction level: minimal  Left rotation:  with pain Restriction level: minimal  Right rotation:  Restriction level: moderate    Joint Play     Hypomobile: T8, T9, T10, T11, T12, L4, L5 and S1     Pain: C6, C7 and T1     Comments: Pain noted with spring testing at cervicothoracic junction with reduced joint mobility in mid-lower thoracic spine    Strength/Myotome Testing     Left Shoulder     Planes of Motion   Flexion: 5   Abduction: 5     Right Shoulder     Planes of Motion   Flexion: 5   Abduction: 5     Left Elbow   Flexion: 5  Extension: 4    Right Elbow   Flexion: 5  Extension: 5    Left Wrist/Hand   Wrist extension: 5  Wrist flexion: 5    Right Wrist/Hand   Wrist extension: 5  Wrist flexion: 5    Left Hip   Planes of Motion   Flexion: 4+  Abduction: 4+  Adduction: 5    Right Hip   Planes of Motion   Flexion: 4+  Abduction: 4+  Adduction: 5    Left Knee   Flexion: 5  Extension: 5    Right Knee   Flexion: 5  Extension: 5    Left Ankle/Foot   Dorsiflexion: 5  Plantar flexion: 5    Right Ankle/Foot   Dorsiflexion: 5  Plantar flexion: 5    Additional Strength Details  All strength testing was WNL with the exception of L elbow extension demonstrating slight weakness comparative to the R elbow  LE myotome testing is [facility]    Tests   Cervical   Positive vertical compression and cervical distraction test     Lumbar   Positive vertical compression  and SIJ compression  Negative sacroiliac distraction  Left   Negative passive SLR, sural bias, tibial bias and slump test      Right   Negative passive SLR, sural bias, tibial bias and slump test      Left Hip   Negative MILES  Right Hip   Positive MILES                Precautions: N/A      Manuals                                                                 Neuro Re-Ed                                                                                                        Ther Ex             LTR x30            SKTC x30            Cat-Cow x30                                                                             Ther Activity                                       Gait Training                                       Modalities             Cervical Traction 10 mins

## 2022-08-01 ENCOUNTER — OFFICE VISIT (OUTPATIENT)
Dept: PHYSICAL THERAPY | Facility: MEDICAL CENTER | Age: 57
End: 2022-08-01
Payer: COMMERCIAL

## 2022-08-01 DIAGNOSIS — M54.12 CERVICAL RADICULOPATHY: ICD-10-CM

## 2022-08-01 DIAGNOSIS — M46.1 SACROILIAC INFLAMMATION (HCC): Primary | ICD-10-CM

## 2022-08-01 PROCEDURE — 97012 MECHANICAL TRACTION THERAPY: CPT | Performed by: PHYSICAL THERAPIST

## 2022-08-01 PROCEDURE — 97140 MANUAL THERAPY 1/> REGIONS: CPT | Performed by: PHYSICAL THERAPIST

## 2022-08-01 PROCEDURE — 97110 THERAPEUTIC EXERCISES: CPT | Performed by: PHYSICAL THERAPIST

## 2022-08-01 NOTE — PROGRESS NOTES
Progress Note     Today's date: 2022  Patient name: Mu Dawson  : 1965  MRN: [id number]  Referring provider: Karo Hernández , *  Dx:   Encounter Diagnosis     ICD-10-CM    1  Sacroiliac inflammation (HCC)  M46 1    2  Cervical radiculopathy  M54 12        Start Time: 1000  Stop Time: 1100  Total time in clinic (min): 60 minutes    Subjective: Amy Faustin reports that he is doing well since last visit and that the stretches prescribed at last visit have provided him some relief, but the days he has to work tend to be a bit more rough in terms of pain  Objective: See treatment diary below      Assessment: Tolerated treatment well  Patient exhibited good technique with therapeutic exercises and would benefit from continued PT  Patient was capable of incorporating specific hip strengthening today without exacerbation of pain symptoms, and demonstrated appropriate fatigue post exercises  Mobility work appears less apprehensive today as well  Plan: Continue per plan of care        Precautions: N/A      Manuals             Lumbar Prone PA MT                                                   Neuro Re-Ed                                                                                                        Ther Ex             Upright Bike 10 min            SKTC x30            Cat-Cow x30            LTR x30            Clamshells Red  x30            Bridges x30            Piriformis Stretch 3 x 30sec                         Ther Activity                                       Gait Training                                       Modalities             Cervical Traction 10 mins

## 2022-08-03 ENCOUNTER — OFFICE VISIT (OUTPATIENT)
Dept: PHYSICAL THERAPY | Facility: MEDICAL CENTER | Age: 57
End: 2022-08-03
Payer: COMMERCIAL

## 2022-08-03 DIAGNOSIS — M54.12 CERVICAL RADICULOPATHY: ICD-10-CM

## 2022-08-03 DIAGNOSIS — M46.1 SACROILIAC INFLAMMATION (HCC): Primary | ICD-10-CM

## 2022-08-03 PROCEDURE — 97012 MECHANICAL TRACTION THERAPY: CPT | Performed by: PHYSICAL THERAPIST

## 2022-08-03 PROCEDURE — 97140 MANUAL THERAPY 1/> REGIONS: CPT | Performed by: PHYSICAL THERAPIST

## 2022-08-03 PROCEDURE — 97110 THERAPEUTIC EXERCISES: CPT | Performed by: PHYSICAL THERAPIST

## 2022-08-03 NOTE — PROGRESS NOTES
Progress Note     Today's date: 8/3/2022  Patient name: Crissy Romano  : 1965  MRN: [id number]  Referring provider: Juanjo Frias , *  Dx:   No diagnosis found  Subjective: Mady Goldberg reports that he is doing well today and that he was feeling good after last session with the addition of the hip strengthening exercises  He notes that he was feeling a bit sore in the hip following the added banded clamshell exercise to his HEP but describes it as a muscle soreness  He mentions not needing to take aleve for work yesterday (which he had been doing prior)  Objective: See treatment diary below      Assessment: Tolerated treatment well  Patient exhibited good technique with therapeutic exercises and would benefit from continued PT  Patient continues to benefit from spinal mobility work and has been responding well to the new strengthening interventions without issue  Plan: Continue per plan of care        Precautions: N/A      Manuals             Lumbar Prone PA MT                                                   Neuro Re-Ed                                                                                                        Ther Ex             Upright Bike 10 min            Prone Extensions x20            Cat-Cow x30            LTR x30            Clamshells Red  x30            Bridges x30            Piriformis Stretch 3 x 30sec            PB TA Brace 5 sec x 30            Ther Activity                                       Gait Training                                       Modalities             Cervical Traction 10 mins

## 2022-08-08 ENCOUNTER — OFFICE VISIT (OUTPATIENT)
Dept: PHYSICAL THERAPY | Facility: MEDICAL CENTER | Age: 57
End: 2022-08-08
Payer: COMMERCIAL

## 2022-08-08 DIAGNOSIS — M54.12 CERVICAL RADICULOPATHY: ICD-10-CM

## 2022-08-08 DIAGNOSIS — M46.1 SACROILIAC INFLAMMATION (HCC): Primary | ICD-10-CM

## 2022-08-08 PROCEDURE — 97110 THERAPEUTIC EXERCISES: CPT | Performed by: PHYSICAL THERAPIST

## 2022-08-08 PROCEDURE — 97012 MECHANICAL TRACTION THERAPY: CPT | Performed by: PHYSICAL THERAPIST

## 2022-08-08 PROCEDURE — 97140 MANUAL THERAPY 1/> REGIONS: CPT | Performed by: PHYSICAL THERAPIST

## 2022-08-08 NOTE — PROGRESS NOTES
Progress Note     Today's date: 2022  Patient name: Castro Reno  : 1965  MRN: [id number]  Referring provider: Julieth Ortiz , *  Dx:   Encounter Diagnosis     ICD-10-CM    1  Sacroiliac inflammation (HCC)  M46 1    2  Cervical radiculopathy  M54 12        Start Time: 1200  Stop Time: 1300  Total time in clinic (min): 60 minutes    Subjective: Ayana Dear reports that he has been doing pretty well but notes that with work earlier in the morning over the weekend, he was unable to get his stretches in  He reports feeling a difference having not done them, having slightly more soreness comparatively  He wants to know if his HEP can be consolidated  Objective: See treatment diary below      Assessment: Tolerated treatment well  Patient exhibited good technique with therapeutic exercises and would benefit from continued PT  Today's focus was on developing a consolidated HEP that included exercises reviewed during today's session that provided similar relief  Exercises were reviewed and prescribed for home  Plan: Continue per plan of care        Precautions: N/A      Manuals             Lumbar Prone PA MT            Theragun MT                                      Neuro Re-Ed                                                                                                        Ther Ex             Upright Bike 10 min            Prone Extensions x20            Cat-Cow x30            Half-Lunge Hip Opener 5sec  x20             Walks Red  x30            Bridges x30            Piriformis Stretch 3 x 30sec            PB TA Brace 5 sec x 30            Ther Activity                                       Gait Training                                       Modalities             Cervical Traction 10 mins

## 2022-08-10 ENCOUNTER — OFFICE VISIT (OUTPATIENT)
Dept: PHYSICAL THERAPY | Facility: MEDICAL CENTER | Age: 57
End: 2022-08-10
Payer: COMMERCIAL

## 2022-08-10 DIAGNOSIS — M54.12 CERVICAL RADICULOPATHY: ICD-10-CM

## 2022-08-10 DIAGNOSIS — M46.1 SACROILIAC INFLAMMATION (HCC): Primary | ICD-10-CM

## 2022-08-10 PROCEDURE — 97140 MANUAL THERAPY 1/> REGIONS: CPT | Performed by: PHYSICAL THERAPIST

## 2022-08-10 PROCEDURE — 97012 MECHANICAL TRACTION THERAPY: CPT | Performed by: PHYSICAL THERAPIST

## 2022-08-10 PROCEDURE — 97110 THERAPEUTIC EXERCISES: CPT | Performed by: PHYSICAL THERAPIST

## 2022-08-10 NOTE — PROGRESS NOTES
Progress Note     Today's date: 8/10/2022  Patient name: Mu Dawson  : 1965  MRN: [id number]  Referring provider: Karo Hernández , *  Dx:   Encounter Diagnosis     ICD-10-CM    1  Sacroiliac inflammation (HCC)  M46 1    2  Cervical radiculopathy  M54 12        Start Time: 1000  Stop Time: 1100  Total time in clinic (min): 60 minutes    Subjective: Amy Faustin reports that he has been doing pretty well and that he managed to get in the consolidated HEP prior to work without too much issue and found relief with the shortened program   He notes he was only able to fit in the mobility work and held the strengthening for after work  Objective: See treatment diary below      Assessment: Tolerated treatment well  Patient exhibited good technique with therapeutic exercises and would benefit from continued PT  Patient was capable of progressing hip strengthening interventions during today's session with less soreness in the hip upon completion compared to previous sessions  Plan: Continue per plan of care        Precautions: N/A      Manuals 8/10            Lumbar Prone PA MT            Theragun MT                                      Neuro Re-Ed                                                                                                        Ther Ex             Upright Bike 10 min            Prone Extensions x20            Cat-Cow x30            Half-Lunge Hip Opener 5sec  x20             Walks Red  x30            Bridges x30            Piriformis Stretch 3 x 30sec            PB TA Brace 5 sec x 30            Ther Activity                                       Gait Training                                       Modalities             Cervical Traction 10 mins

## 2022-08-15 ENCOUNTER — APPOINTMENT (OUTPATIENT)
Dept: PHYSICAL THERAPY | Facility: MEDICAL CENTER | Age: 57
End: 2022-08-15
Payer: COMMERCIAL

## 2022-08-17 ENCOUNTER — OFFICE VISIT (OUTPATIENT)
Dept: PHYSICAL THERAPY | Facility: MEDICAL CENTER | Age: 57
End: 2022-08-17
Payer: COMMERCIAL

## 2022-08-17 DIAGNOSIS — M54.12 CERVICAL RADICULOPATHY: ICD-10-CM

## 2022-08-17 DIAGNOSIS — M46.1 SACROILIAC INFLAMMATION (HCC): Primary | ICD-10-CM

## 2022-08-17 PROCEDURE — 97012 MECHANICAL TRACTION THERAPY: CPT | Performed by: PHYSICAL THERAPIST

## 2022-08-17 PROCEDURE — 97140 MANUAL THERAPY 1/> REGIONS: CPT | Performed by: PHYSICAL THERAPIST

## 2022-08-17 PROCEDURE — 97110 THERAPEUTIC EXERCISES: CPT | Performed by: PHYSICAL THERAPIST

## 2022-08-17 NOTE — PROGRESS NOTES
Progress Note     Today's date: 2022  Patient name: Dorota Mitchell  : 1965  MRN: [id number]  Referring provider: Shameka Webb , *  Dx:   Encounter Diagnosis     ICD-10-CM    1  Sacroiliac inflammation (HCC)  M46 1    2  Cervical radiculopathy  M54 12        Start Time: 0200  Stop Time: 0300  Total time in clinic (min): 60 minutes    Subjective: Sulma Sim reports that he has been doing well in general and that his exercises at home have been helping keep his hip soreness at [facility] [address]  Today he has some slight soreness in the hip but improved compared to last session  Objective: See treatment diary below      Assessment: Tolerated treatment well  Patient exhibited good technique with therapeutic exercises and would benefit from continued PT  Patient was capable of progressions in his hip strengthening interventions and additional mobility exercises were demonstrated and reviewed during session to add to his consolidated HEP  Plan: Continue per plan of care        Precautions: N/A      Manuals 8/15            Lumbar Prone PA MT            Theragun MT                                      Neuro Re-Ed                                                                                                        Ther Ex             Upright Bike 10 min            Prone Extensions on Hands x20            Cat-Cow x30            Half-Lunge Hip Opener 5sec  x20             Walks Red  x30            Bridges x30            Piriformis Stretch 3 x 30sec            PB TA Brace 5 sec x 30            Ther Activity                                       Gait Training                                       Modalities             Cervical Traction 10 mins

## 2022-08-22 ENCOUNTER — OFFICE VISIT (OUTPATIENT)
Dept: PHYSICAL THERAPY | Facility: MEDICAL CENTER | Age: 57
End: 2022-08-22
Payer: COMMERCIAL

## 2022-08-22 DIAGNOSIS — M54.12 CERVICAL RADICULOPATHY: ICD-10-CM

## 2022-08-22 DIAGNOSIS — M46.1 SACROILIAC INFLAMMATION (HCC): Primary | ICD-10-CM

## 2022-08-22 PROCEDURE — 97012 MECHANICAL TRACTION THERAPY: CPT | Performed by: PHYSICAL THERAPIST

## 2022-08-22 PROCEDURE — 97140 MANUAL THERAPY 1/> REGIONS: CPT | Performed by: PHYSICAL THERAPIST

## 2022-08-22 PROCEDURE — 97110 THERAPEUTIC EXERCISES: CPT | Performed by: PHYSICAL THERAPIST

## 2022-08-22 NOTE — PROGRESS NOTES
Progress Note     Today's date: 2022  Patient name: Tri Hammond  : 1965  MRN: [id number]  Referring provider: Laura Carvalho , *  Dx:   Encounter Diagnosis     ICD-10-CM    1  Sacroiliac inflammation (HCC)  M46 1    2  Cervical radiculopathy  M54 12        Start Time: 0900  Stop Time: 1000  Total time in clinic (min): 60 minutes    Subjective: Marisela Ramos reports that he is doing well and that his hip has been holding up well  He has been consistent with the new stretches added at last session and he notes that they help  He notes that he was very active since last session without any instances of symptom exacerbation aside for some slight stiffness  Objective: See treatment diary below      Assessment: Tolerated treatment well  Patient exhibited good technique with therapeutic exercises and would benefit from continued PT  Plan: Continue per plan of care        Precautions: N/A      Manuals             Lumbar Prone PA MT            Theragun MT                                      Neuro Re-Ed                                                                                                        Ther Ex             Upright Bike 10 min            Prone Extensions on Hands x20            Cat-Cow x30            Half-Lunge Hip Opener 5sec  x20             Walks Red  x30            Bridges x30            Piriformis Stretch 3 x 30sec            PB TA Brace 5 sec x 30            Ther Activity                                       Gait Training                                       Modalities             Cervical Traction 10 mins fall

## 2022-08-24 ENCOUNTER — OFFICE VISIT (OUTPATIENT)
Dept: PHYSICAL THERAPY | Facility: MEDICAL CENTER | Age: 57
End: 2022-08-24
Payer: COMMERCIAL

## 2022-08-24 DIAGNOSIS — M46.1 SACROILIAC INFLAMMATION (HCC): Primary | ICD-10-CM

## 2022-08-24 DIAGNOSIS — M54.12 CERVICAL RADICULOPATHY: ICD-10-CM

## 2022-08-24 PROCEDURE — 97110 THERAPEUTIC EXERCISES: CPT | Performed by: PHYSICAL THERAPIST

## 2022-08-24 PROCEDURE — 97012 MECHANICAL TRACTION THERAPY: CPT | Performed by: PHYSICAL THERAPIST

## 2022-08-24 PROCEDURE — 97140 MANUAL THERAPY 1/> REGIONS: CPT | Performed by: PHYSICAL THERAPIST

## 2022-08-24 NOTE — PROGRESS NOTES
Progress Note     Today's date: 2022  Patient name: Rosalind Hall  : 1965  MRN: [id number]  Referring provider: Will Rubi , *  Dx:   Encounter Diagnosis     ICD-10-CM    1  Sacroiliac inflammation (HCC)  M46 1    2  Cervical radiculopathy  M54 12        Start Time: 1000  Stop Time: 1100  Total time in clinic (min): 60 minutes    Subjective: Jones Son reports that he is doing well and that he wasn't able to get into all of his stretches since last session but his hips have held up without any increase in symptoms  Because the hips are doing well, he wanted to increase the focus on his neck today  Objective: See treatment diary below      Assessment: Tolerated treatment well  Patient exhibited good technique with therapeutic exercises and would benefit from continued PT  Patient presented again today with sustained relief of symptoms in the hip since last session, and today's session had an increased focus on symptoms originating from the neck  Patient responded well to new interventions focused on cervicothoracic mobility within tolerability and was prescribed them to add to his HEP  Plan: Continue per plan of care        Precautions: N/A      Manuals             Cervicothoracic Prone PA MT            Theragun MT                                      Neuro Re-Ed                                                                                                        Ther Ex             Upright Bike 10 min            Prone Extensions on Hands x20            Supine Chin Tucks  x30            Half-Lunge Hip Opener 5sec  x20             Walks Red  x30            Bridges x30            Piriformis Stretch 3 x 30sec            CT Extensions vs Foam 5 sec x 30            Ther Activity                                       Gait Training                                       Modalities             Cervical Traction 10 mins

## 2022-08-29 ENCOUNTER — OFFICE VISIT (OUTPATIENT)
Dept: PHYSICAL THERAPY | Facility: MEDICAL CENTER | Age: 57
End: 2022-08-29
Payer: COMMERCIAL

## 2022-08-29 DIAGNOSIS — M54.12 CERVICAL RADICULOPATHY: ICD-10-CM

## 2022-08-29 DIAGNOSIS — M46.1 SACROILIAC INFLAMMATION (HCC): Primary | ICD-10-CM

## 2022-08-29 PROCEDURE — 97140 MANUAL THERAPY 1/> REGIONS: CPT | Performed by: PHYSICAL THERAPIST

## 2022-08-29 PROCEDURE — 97012 MECHANICAL TRACTION THERAPY: CPT | Performed by: PHYSICAL THERAPIST

## 2022-08-29 PROCEDURE — 97110 THERAPEUTIC EXERCISES: CPT | Performed by: PHYSICAL THERAPIST

## 2022-08-29 NOTE — PROGRESS NOTES
Progress Note     Today's date: 2022  Patient name: Elidu Bhagat  : 1965  MRN: [id number]  Referring provider: Zara Perkins , *  Dx:   Encounter Diagnosis     ICD-10-CM    1  Sacroiliac inflammation (HCC)  M46 1    2  Cervical radiculopathy  M54 12        Start Time: 830  Stop Time: 930  Total time in clinic (min): 60 minutes    Subjective: Julisa Uribe reports that he is doing well though he has some right sided lower back soreness/stiffness  He mentions that he was walking quite a bit over the weekend and that when he would take rest breaks he would notice the stiffness  He also mentions only being able to do his stretches once over the work week and believes this is correlated to his increased soreness  Objective: See treatment diary below      Assessment: Tolerated treatment well  Patient exhibited good technique with therapeutic exercises and would benefit from continued PT  Patient presented today with slight increase in soreness in the R lower back that was reduced upon application of manual therapy and stretching  Symptoms continue to persist in the R UE where numbness and tingling are constant despite today's interventions  Plan: Continue per plan of care        Precautions: N/A      Manuals             Cervicothoracic Prone PA MT            Theragun MT                                      Neuro Re-Ed                                                                                                        Ther Ex             Upright Bike 10 min            Wall Slides + Lift Off/Retraction 3 x 10            Quadruped Chin Tucks  3 x 10            3 Way Half-Lunge Hip Opener 5sec  x20             Walks Green   3 x 10            Bridges 3 x 10            Piriformis Stretch 3 x 30sec            CT Extensions vs Foam 5 sec x 30            Ther Activity                                       Gait Training                                       Modalities             Cervical Traction 10 mins

## 2022-08-31 ENCOUNTER — OFFICE VISIT (OUTPATIENT)
Dept: PHYSICAL THERAPY | Facility: MEDICAL CENTER | Age: 57
End: 2022-08-31
Payer: COMMERCIAL

## 2022-08-31 DIAGNOSIS — M46.1 SACROILIAC INFLAMMATION (HCC): Primary | ICD-10-CM

## 2022-08-31 DIAGNOSIS — M54.12 CERVICAL RADICULOPATHY: ICD-10-CM

## 2022-08-31 PROCEDURE — 97140 MANUAL THERAPY 1/> REGIONS: CPT | Performed by: PHYSICAL THERAPIST

## 2022-08-31 PROCEDURE — 97012 MECHANICAL TRACTION THERAPY: CPT | Performed by: PHYSICAL THERAPIST

## 2022-08-31 PROCEDURE — 97110 THERAPEUTIC EXERCISES: CPT | Performed by: PHYSICAL THERAPIST

## 2022-08-31 NOTE — PROGRESS NOTES
Progress Note     Today's date: 2022  Patient name: Lorrie Bee  : 1965  MRN: [id number]  Referring provider: Julisa Fournier , *  Dx:   Encounter Diagnosis     ICD-10-CM    1  Sacroiliac inflammation (HCC)  M46 1    2  Cervical radiculopathy  M54 12        Start Time: 1030  Stop Time: 1130  Total time in clinic (min): 60 minutes    Subjective: Fernanda Crawford reports that he is doing well though he has some right sided lower back soreness/stiffness  He mentions that he will be moving his son into school today following our session so he anticipates today being a heavy load day for the back  Objective: See treatment diary below      Assessment: Tolerated treatment well  Patient exhibited good technique with therapeutic exercises and would benefit from continued PT  Patient responded well to manual therapy today with the addition of passive cervical retractions with extension  Patient presented with increased tone B/L in the UT (L>R) that was reduced with STM following the added neck stretches  The discomfort with stretches was alleviated following brief work along the UTs/anterior scalenes  Plan: Continue per plan of care        Precautions: N/A      Manuals             Cervicothoracic Prone PA (NT)            Theragun MT            Cervical Retraction + Extension MT                         Neuro Re-Ed                                                                                                        Ther Ex             Upright Bike 10 min            Wall Slides + Lift Off/Retraction 3 x 10            Quadruped Chin Tucks  3 x 10            3 Way Half-Lunge Hip Opener 5sec  x20             Walks Green   3 x 10            Bridges 3 x 10            Piriformis Stretch 3 x 30sec            UT/LS Stretch 3 x 30sec            Ther Activity                                       Gait Training                                       Modalities             Cervical Traction 10 mins

## 2022-09-07 ENCOUNTER — APPOINTMENT (OUTPATIENT)
Dept: PHYSICAL THERAPY | Facility: MEDICAL CENTER | Age: 57
End: 2022-09-07
Payer: COMMERCIAL

## 2022-09-12 ENCOUNTER — OFFICE VISIT (OUTPATIENT)
Dept: PHYSICAL THERAPY | Facility: MEDICAL CENTER | Age: 57
End: 2022-09-12
Payer: COMMERCIAL

## 2022-09-12 DIAGNOSIS — M54.12 CERVICAL RADICULOPATHY: ICD-10-CM

## 2022-09-12 DIAGNOSIS — M46.1 SACROILIAC INFLAMMATION (HCC): Primary | ICD-10-CM

## 2022-09-12 PROCEDURE — 97140 MANUAL THERAPY 1/> REGIONS: CPT | Performed by: PHYSICAL THERAPIST

## 2022-09-12 PROCEDURE — 97012 MECHANICAL TRACTION THERAPY: CPT | Performed by: PHYSICAL THERAPIST

## 2022-09-12 PROCEDURE — 97110 THERAPEUTIC EXERCISES: CPT | Performed by: PHYSICAL THERAPIST

## 2022-09-12 NOTE — PROGRESS NOTES
Progress Note     Today's date: 2022  Patient name: Castro Reno  : 1965  MRN: [id number]  Referring provider: Julieth Ortiz , *  Dx:   Encounter Diagnosis     ICD-10-CM    1  Sacroiliac inflammation (HCC)  M46 1    2  Cervical radiculopathy  M54 12        Start Time: 0900  Stop Time: 1000  Total time in clinic (min): 60 minutes    Subjective: Ayana Dear reports that he is doing well and that his hip and back have been holding up well despite not being able to get in his exercises over last week while being away with his wife  He notes that he had some hip soreness one day over his vacation but it subsided relatively quickly  Objective: See treatment diary below      Assessment: Tolerated treatment well  Patient exhibited good technique with therapeutic exercises and would benefit from continued PT  Patient presented with reduced muscle tone comparative to last session along the lumbar paraspinals as well as in the B L UT/Anterior scalenes  Patient experienced reduced discomfort with neck stretches today compared to last session  Plan: Continue per plan of care        Precautions: N/A      Manuals             Cervicothoracic Prone PA (NT)            Theragun MT            Cervical Retraction + Extension MT                         Neuro Re-Ed                                                                                                        Ther Ex             Upright Bike 10 min            Wall Slides + Lift Off/Retraction 3 x 10            Quadruped Chin Tucks  3 x 10            3 Way Half-Lunge Hip Opener 5sec  x20             Walks Green   3 x 10            Bridges 3 x 10            Piriformis Stretch 3 x 30sec            UT/LS Stretch 3 x 30sec            Ther Activity                                       Gait Training                                       Modalities             Cervical Traction 10 mins

## 2022-09-14 ENCOUNTER — OFFICE VISIT (OUTPATIENT)
Dept: PHYSICAL THERAPY | Facility: MEDICAL CENTER | Age: 57
End: 2022-09-14
Payer: COMMERCIAL

## 2022-09-14 DIAGNOSIS — M54.12 CERVICAL RADICULOPATHY: ICD-10-CM

## 2022-09-14 DIAGNOSIS — M46.1 SACROILIAC INFLAMMATION (HCC): Primary | ICD-10-CM

## 2022-09-14 PROCEDURE — 97110 THERAPEUTIC EXERCISES: CPT | Performed by: PHYSICAL THERAPIST

## 2022-09-14 PROCEDURE — 97140 MANUAL THERAPY 1/> REGIONS: CPT | Performed by: PHYSICAL THERAPIST

## 2022-09-14 PROCEDURE — 97012 MECHANICAL TRACTION THERAPY: CPT | Performed by: PHYSICAL THERAPIST

## 2022-09-14 NOTE — PROGRESS NOTES
Progress Note     Today's date: 2022  Patient name: Herve Mandujano  : 1965  MRN: [id number]  Referring provider: Yi Jenkins , *  Dx:   Encounter Diagnosis     ICD-10-CM    1  Sacroiliac inflammation (HCC)  M46 1    2  Cervical radiculopathy  M54 12        Start Time: 0300  Stop Time: 0  Total time in clinic (min): 60 minutes    Subjective: Kadi Fam reports that he is doing well today but mentions that yesterday was his first day back at work and that his hip was bothering him afterward  He also notes that he has gotten back into the gym since our last session and says that his elbow has been giving him some discomfort that travels into the pinky finger  He also notes that he was unable to get to his stretches prior to work  Objective: See treatment diary below      Assessment: Tolerated treatment well  Patient exhibited good technique with therapeutic exercises and would benefit from continued PT  Patient was educated regarding the addition of an ulnar nerve glide to his HEP to address neural tension observed during session today of which he has noticed giving him more trouble with increased activity levels at home (returning to work/gym)  Today's session was focused on reviewing and progressing core strengthening interventions both statically/dynamically  Body mechanics were reviewed in regards to 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  Heavy emphases was placed on engaging the core and hips along with cueing hip hinging when encountering lifting  Plan: Continue per plan of care        Precautions: N/A      Manuals             Cervicothoracic Prone PA (NT)            Theragun MT            Cervical Retraction + Extension MT                         Neuro Re-Ed                                                                                                        Ther Ex             Upright Bike 10 min            Wall Slides + Lift Off/Retraction 3 x 10 Quadruped Chin Tucks  3 x 10            3 Way Half-Lunge Hip Opener 5sec  x20             Walks Green   3 x 10            Ulnar Nerve Glides x15            Piriformis Stretch 3 x 30sec            UT/LS Stretch 3 x 30sec            Ther Activity             KB Deadlift 20#  3 x 10                         Gait Training                                       Modalities             Cervical Traction 10 mins

## 2022-10-02 NOTE — PATIENT INSTRUCTIONS
Reviewed health history-    Has completed 12 sessions /6 weeks of PT for SI Jt and Cervical spine, he continues with significant weakness and atrophy  left triceps, left pectoral and notes ongoing paresthesia/numbness into left hand especially at 3rd finger  Did have prior 2015 MRI cervical spine showing significant finding, see below  Previously his insurance declined MRI coverage in July, I reordered that, does need MRI of CSpine, medically necessary, plan neurosurgical/spinal consult afterwards  Previously had seen pain management, had no response with epidural steroid injections  Used Medrol in July -   uses OTC Aleve at times  He has been exercising at home, continue with routine exercise, strengthening as tolerated  He has noted improvement regarding sacroiliac pain  ( He did have dental implants with screw, he will check with his dentist today to make sure those were MRI compatible)  He did have x-rays cervical spine back in 2015, he has had no new trauma, I held off on ordering x-ray as he needs MRI  2015 MRI Cspine =   Multilevel degenerative spondylosis   Small noncompressive central disc protrusion C4-5   Moderate bilateral foraminal stenosis C5-6   Mild bilateral foraminal stenosis, small broad left-sided disc     We did review previous blood work, July CBC/CMP were fine  He is up to date with Lipid screening  July Chol 185 w HDL 56,  -  4 4% 10-yr risk -  redo few yrs  He is up to date with Diabetes screening  Glucose was 101    He does not do yearly Flu shot  Tdap/tetanus shot is up to date  ( had about 8-9 yrs at work but no date -  update today)(done every 10 yrs for superficial cuts, every 5 yrs for deep wounds)   Can also look into coverage for shingles shot, Shingrix - Can do that here or at pharmacy    Covid vaccine declined -   had Covid infection      Was never a smoker     Regarding Colon Cancer screening, he had a Colonoscopy Nov 2019 -  tubular adenoma - redo when advised by Dr Jeffry Kwan ( at least by 11/24)    Discussed Prostate Cancer screening pros/cons starting at age 48  PSA blood test up-to-date  Psa was fine in July at 0 9  Does have decreased urinary stream at times w/out nocturia, 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

## 2022-10-03 ENCOUNTER — OFFICE VISIT (OUTPATIENT)
Dept: FAMILY MEDICINE CLINIC | Facility: CLINIC | Age: 57
End: 2022-10-03
Payer: COMMERCIAL

## 2022-10-03 VITALS
DIASTOLIC BLOOD PRESSURE: 78 MMHG | TEMPERATURE: 97 F | HEIGHT: 65 IN | BODY MASS INDEX: 27.32 KG/M2 | WEIGHT: 164 LBS | SYSTOLIC BLOOD PRESSURE: 128 MMHG | HEART RATE: 71 BPM | OXYGEN SATURATION: 99 %

## 2022-10-03 DIAGNOSIS — M50.30 DEGENERATIVE DISC DISEASE, CERVICAL: ICD-10-CM

## 2022-10-03 DIAGNOSIS — Z00.00 ENCOUNTER FOR ANNUAL PHYSICAL EXAM: Primary | ICD-10-CM

## 2022-10-03 DIAGNOSIS — R39.198 URINARY STREAM SLOWING: ICD-10-CM

## 2022-10-03 DIAGNOSIS — M54.2 NECK PAIN: ICD-10-CM

## 2022-10-03 DIAGNOSIS — Z23 NEED FOR DIPHTHERIA-TETANUS-PERTUSSIS (TDAP) VACCINE: ICD-10-CM

## 2022-10-03 DIAGNOSIS — R29.898 WEAKNESS OF LEFT UPPER EXTREMITY: ICD-10-CM

## 2022-10-03 DIAGNOSIS — M54.12 CERVICAL RADICULOPATHY: ICD-10-CM

## 2022-10-03 PROCEDURE — 90715 TDAP VACCINE 7 YRS/> IM: CPT

## 2022-10-03 PROCEDURE — 90471 IMMUNIZATION ADMIN: CPT

## 2022-10-03 PROCEDURE — 99396 PREV VISIT EST AGE 40-64: CPT | Performed by: FAMILY MEDICINE

## 2022-10-03 RX ORDER — TAMSULOSIN HYDROCHLORIDE 0.4 MG/1
0.4 CAPSULE ORAL
Qty: 30 CAPSULE | Refills: 1 | Status: SHIPPED | OUTPATIENT
Start: 2022-10-03

## 2022-10-03 NOTE — PROGRESS NOTES
BMI Counseling: Body mass index is 27 29 kg/m²  The BMI is above normal  Nutrition recommendations include encouraging healthy choices of fruits and vegetables and moderation in carbohydrate intake  Exercise recommendations include exercising 3-5 times per week  Rationale for BMI follow-up plan is due to patient being overweight or obese  Depression Screening and Follow-up Plan: Patient was screened for depression during today's encounter  They screened negative with a PHQ-2 score of 0       FAMILY PRACTICE OFFICE VISIT  [facility]  [address]  [address], [place], [facility]      NAME: Dawn Lockhart  AGE: 49 y o   SEX: male  : 1965   MRN: [id number]    DATE: 10/3/2022  TIME: 9:37 AM    Assessment and Plan     Problem List Items Addressed This Visit     Urinary stream slowing    Relevant Medications    tamsulosin (FLOMAX) 0 4 mg    Cervical radiculopathy    Relevant Orders    MRI cervical spine wo contrast    Ambulatory Referral to Neurosurgery    Degenerative disc disease, cervical    Relevant Orders    MRI cervical spine wo contrast    Ambulatory Referral to Neurosurgery      Other Visit Diagnoses     Encounter for annual physical exam    -  Primary    Need for diphtheria-tetanus-pertussis (Tdap) vaccine        Relevant Orders    TDAP VACCINE GREATER THAN OR EQUAL TO 8YO IM (Completed)    BMI 27 0-27 9,adult        Weakness of left upper extremity        Relevant Orders    MRI cervical spine wo contrast    Ambulatory Referral to Neurosurgery    Neck pain        Relevant Orders    MRI cervical spine wo contrast    Ambulatory Referral to Neurosurgery          Patient Instructions   Reviewed health history-    Has completed 12 sessions /6 weeks of PT for SI Jt and Cervical spine, he continues with significant weakness and atrophy  left triceps, left pectoral and notes ongoing paresthesia/numbness into left hand especially at 3rd finger  Did have prior 2015 MRI cervical spine showing significant finding, see below  Previously his insurance declined MRI coverage in July, I reordered that, does need MRI of CSpine, medically necessary, plan neurosurgical/spinal consult afterwards  Previously had seen pain management, had no response with epidural steroid injections  Used Medrol in July -   uses OTC Aleve at times  He has been exercising at home, continue with routine exercise, strengthening as tolerated  He has noted improvement regarding sacroiliac pain  ( He did have dental implants with screw, he will check with his dentist today to make sure those were MRI compatible)  He did have x-rays cervical spine back in 2015, he has had no new trauma, I held off on ordering x-ray as he needs MRI  2015 MRI Cspine =   Multilevel degenerative spondylosis   Small noncompressive central disc protrusion C4-5   Moderate bilateral foraminal stenosis C5-6   Mild bilateral foraminal stenosis, small broad left-sided disc     We did review previous blood work, July CBC/CMP were fine  He is up to date with Lipid screening  July Chol 185 w HDL 56,  -  4 4% 10-yr risk -  redo few yrs  He is up to date with Diabetes screening  Glucose was 101    He does not do yearly Flu shot  Tdap/tetanus shot is up to date  ( had about 8-9 yrs at work but no date -  update today)(done every 10 yrs for superficial cuts, every 5 yrs for deep wounds)   Can also look into coverage for shingles shot, Shingrix - Can do that here or at pharmacy  Covid vaccine declined -   had Covid infection      Was never a smoker     Regarding Colon Cancer screening, he had a Colonoscopy Nov 2019 -  tubular adenoma - redo when advised by Dr Elicia Hodgkins ( at least by 11/24)    Discussed Prostate Cancer screening pros/cons starting at age 48  PSA blood test up-to-date  Psa was fine in July at 0 9    Does have decreased urinary stream at times w/out nocturia, 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)  Could try Flomax 0 4mg at night -  watch for lightheadedness  Regarding Hepatitis C Screening - he will not do Hepatitis C screen  low risk  Continue to try to watch healthy diet, exercise routinely  Await MRI /Consult -  update us few weeks re Flomax/ Tamsulosin -  we should see him at least yrly             Chief Complaint     Chief Complaint   Patient presents with    Physical Exam   Discuss neck pain with weakness left arm    History of Present Illness   Maximus Nunes is a 49y o -year-old male who is in for a general checkup, he had been seen in our office back in July, his insurance would not allow him to do the MRI of his cervical spine without doing physical therapy, he has completed 12 sessions, 6 weeks  He notes his low back/sacroiliac area is improved, some improvement with neck pain but has ongoing weakness left triceps, paresthesias down left arm into left hand, especially 3rd finger, does note significant atrophy left pectoral   Had MRI back in 2015  Uses Aleve on occasion, did use Medrol pack back in July  Otherwise he does feel okay except for some urinary stream slowing, does work in delivery truck, some urgency, finds this bothersome  Gets up on occasion at night  Review of Systems   Review of Systems   Constitutional: Negative for appetite change, fatigue, fever and unexpected weight change  HENT: Negative for sore throat and trouble swallowing  Respiratory: Negative for cough, chest tightness and shortness of breath  Cardiovascular: Negative for chest pain, palpitations and leg swelling  Gastrointestinal: Negative for abdominal pain, blood in stool, nausea and vomiting  No acid reflux     No change in bowel   Genitourinary: Negative for dysuria and hematuria  Slow stream w urgency at times   Musculoskeletal: Positive for neck pain and neck stiffness     Neurological: Positive for weakness (left triceps /pectoral w paresthesia to left 3rd finger)  Negative for dizziness, syncope, light-headedness and headaches  Hematological: Does not bruise/bleed easily  Psychiatric/Behavioral: Negative for behavioral problems and confusion  Active Problem List     Patient Active Problem List   Diagnosis    Cervical radiculopathy    Urinary stream slowing    Sacroiliac inflammation (HCC)    Degenerative disc disease, cervical       Past Medical History:  Reviewed    Past Surgical History:  Reviewed    Family History:  Reviewed    Social History:  Reviewed    Objective     Vitals:    10/03/22 0812   BP: 128/78   BP Location: Left arm   Patient Position: Sitting   Cuff Size: Large   Pulse: 71   Temp: (!) 97 °F (36 1 °C)   SpO2: 99%   Weight: 74 4 kg (164 lb)   Height: 5' 5" (1 651 m)     Body mass index is 27 29 kg/m²  BP Readings from Last 3 Encounters:   10/03/22 128/78   07/18/22 132/80   11/20/19 142/76       Wt Readings from Last 3 Encounters:   10/03/22 74 4 kg (164 lb)   07/18/22 74 2 kg (163 lb 9 6 oz)   11/20/19 74 8 kg (165 lb)       Physical Exam  Constitutional:       Appearance: He is well-developed  Comments: Pleasant 53-year-old seated on table, comfortable at rest     Does have some decreased range of motion cervical spine all planes, does have some tenderness lower cervical bilateral   Does have significant atrophic finding left pectoral versus right, left triceps verses right  Does have some proximal weakness left arm, decreased sensation left 3rd finger  No vertebral tenderness   HENT:      Right Ear: Tympanic membrane normal       Left Ear: Tympanic membrane normal       Mouth/Throat:      Mouth: Mucous membranes are moist       Pharynx: Oropharynx is clear  Eyes:      General: No scleral icterus  Neck:      Vascular: No carotid bruit  Cardiovascular:      Rate and Rhythm: Normal rate and regular rhythm  Heart sounds: Normal heart sounds  No murmur heard    Pulmonary:      Effort: Pulmonary effort is normal  No respiratory distress  Breath sounds: Normal breath sounds  No wheezing, rhonchi or rales  Abdominal:      Palpations: Abdomen is soft  Tenderness: There is no abdominal tenderness  Musculoskeletal:      Right lower leg: No edema  Left lower leg: No edema  Lymphadenopathy:      Cervical: No cervical adenopathy  Skin:     Coloration: Skin is not jaundiced  Neurological:      Mental Status: He is oriented to person, place, and time  Psychiatric:         Mood and Affect: Mood normal          Behavior: Behavior normal          ALLERGIES:  No Known Allergies    Current Medications     Current Outpatient Medications   Medication Sig Dispense Refill    tamsulosin (FLOMAX) 0 4 mg Take 1 capsule (0 4 mg total) by mouth daily at bedtime 30 capsule 1     No current facility-administered medications for this visit              Orders Placed This Encounter   Procedures    MRI cervical spine wo contrast    TDAP VACCINE GREATER THAN OR EQUAL TO [address]    Ambulatory Referral to Neurosurgery         Dimitri Fontenot

## 2022-10-12 ENCOUNTER — HOSPITAL ENCOUNTER (OUTPATIENT)
Dept: MRI IMAGING | Facility: HOSPITAL | Age: 57
Discharge: HOME | End: 2022-10-12
Payer: COMMERCIAL

## 2022-10-12 DIAGNOSIS — M54.12 CERVICAL RADICULOPATHY: ICD-10-CM

## 2022-10-12 DIAGNOSIS — R29.898 WEAKNESS OF LEFT UPPER EXTREMITY: ICD-10-CM

## 2022-10-12 DIAGNOSIS — M54.2 NECK PAIN: ICD-10-CM

## 2022-10-12 DIAGNOSIS — M50.30 DEGENERATIVE DISC DISEASE, CERVICAL: ICD-10-CM

## 2022-10-12 PROCEDURE — G1004 CDSM NDSC: HCPCS

## 2022-10-12 PROCEDURE — 72141 MRI NECK SPINE W/O DYE: CPT

## 2022-10-17 PROBLEM — M48.02 FORAMINAL STENOSIS OF CERVICAL REGION: Status: ACTIVE | Noted: 2022-10-17

## 2022-11-30 ENCOUNTER — CONSULT (OUTPATIENT)
Dept: NEUROSURGERY | Facility: CLINIC | Age: 57
End: 2022-11-30

## 2022-11-30 VITALS
SYSTOLIC BLOOD PRESSURE: 142 MMHG | DIASTOLIC BLOOD PRESSURE: 88 MMHG | OXYGEN SATURATION: 96 % | TEMPERATURE: 97.9 F | WEIGHT: 165 LBS | BODY MASS INDEX: 26.52 KG/M2 | HEART RATE: 71 BPM | HEIGHT: 66 IN

## 2022-11-30 DIAGNOSIS — M54.12 CERVICAL RADICULOPATHY: Primary | ICD-10-CM

## 2022-11-30 DIAGNOSIS — R29.898 WEAKNESS OF LEFT UPPER EXTREMITY: ICD-10-CM

## 2022-11-30 DIAGNOSIS — M54.2 NECK PAIN: ICD-10-CM

## 2022-11-30 DIAGNOSIS — M50.30 DEGENERATIVE DISC DISEASE, CERVICAL: ICD-10-CM

## 2022-11-30 DIAGNOSIS — R39.198 URINARY STREAM SLOWING: ICD-10-CM

## 2022-11-30 RX ORDER — COVID-19 ANTIGEN TEST
KIT MISCELLANEOUS DAILY PRN
COMMUNITY

## 2022-11-30 RX ORDER — TAMSULOSIN HYDROCHLORIDE 0.4 MG/1
CAPSULE ORAL
Qty: 30 CAPSULE | Refills: 1 | Status: SHIPPED | OUTPATIENT
Start: 2022-11-30

## 2022-11-30 NOTE — PROGRESS NOTES
Office Note - Neurosurgery   Carlos Enrique Willis 49 y o  male MRN: [id number]      Assessment:    Patient is gradually worsening  53-year-old gentleman with cervical spondylosis and radiculopathy  He does have some atrophy in the left triceps and pectoral muscle though overall his strength seems symmetric  No objective findings of myelopathy  He does feel as if he has subtle weakness in the left hand and arm which he notices when trying to perform chores around the house and at work  I asked him to discuss a course of membrane stabilizing agents with his PCP  We did discuss a C5-6 and C6-7 anterior cervical diskectomy with interbody graft and instrumented fixation fusion  Surgery is intended to decompress neural structures and hopefully improve radicular pain symptoms and stabilize myelopathic symptoms  Weakness, numbness and axial skeletal pain are less likely to improve  The risks of surgery were described in detail  1  Risk of general anesthetic, with possible cardiac and respiratory complication  There is risk of infection and bleeding  2  Risk of neurological injury with new pain, weakness or numbness or difficulties with bowel and bladder function  The risk of CSF leak was described  3  Possible need for revision surgery in the future  Restriction of cervical range of motion  4  Swallowing difficulties and hoarseness of voice are quite common after surgery  These are generally self-limited  On occasion referral to an ENT surgeon and a feeding tube may be required  Expected postoperative course, including activity restrictions, expected pain and postoperative medication were reviewed  Patient provided verbal consent to surgical procedure and signed consent form: No, he would like to take some time to consider his options    He will follow-up in approximately 5 weeks time with flexion-extension films of the cervical spine and for further surgical discussion including possible arthroplasty  History, physical examination and diagnostic tests were reviewed and questions answered  Diagnosis, care plan and treatment options were discussed  The patient understand instructions and will follow up as directed  Plan:    Follow-up:  5 weeks    Problem List Items Addressed This Visit        Nervous and Auditory    Cervical radiculopathy - Primary    Relevant Orders    XR spine cervical complete 6+ vw flex/ext/obl       Musculoskeletal and Integument    Degenerative disc disease, cervical    Relevant Orders    XR spine cervical complete 6+ vw flex/ext/obl   Other Visit Diagnoses     Weakness of left upper extremity        Neck pain              Subjective/Objective     Chief Complaint    Weakness in left arm and neck pain  HPI    53-year-old right-hand-dominant  with longstanding history of neck pain  Approximately 2 years ago he began to notice some atrophy in his left triceps and pectoral muscle  He describes numbness radiating down his left arm and into his left middle finger and to a lesser extent in the right arm  He denies any pain, weakness or numbness in his legs or difficulties with bowel bladder function or changing perineal sensation  He has some mild difficulties with fine motor tasks in the left hand but denies any difficulties with balance  Chiropractic manipulation was helpful for many years  Epidural steroid injections were not helpful in distant past   He is not tried membrane stabilizing agents  He remains active and tries to exercise  He presents today with an MRI of the cervical spine  LUBNA CALVO personally reviewed and updated  Review of Systems   Constitutional: Negative  HENT: Negative  Eyes: Negative  Respiratory: Negative  Cardiovascular: Negative  Gastrointestinal: Negative  Endocrine: Negative  Genitourinary: Negative      Musculoskeletal: Positive for myalgias (occasional cramping in left arm) and neck pain (not constant, sometimes radiates into arms L>R)  Pt x12 sessions- felt good during the session  PM- KATIA x2 (5 years ago)- no help   Skin: Negative  Allergic/Immunologic: Negative  Neurological: Positive for dizziness (very occasional, usually with HA), weakness (left pectoral, and upper arm), numbness (left hand) and headaches (very occasional, manageable)  Hematological: Negative  Psychiatric/Behavioral: Positive for sleep disturbance (pain wakes him up, not sure if r/t neck)  All other systems reviewed and are negative        Family History    Family History   Problem Relation Age of Onset   • Cancer Paternal Grandfather         Unknown origin    • Other Family         Back problem    • Lung cancer Father    • Cancer Maternal Grandfather         unknown origin       Social History    Social History     Socioeconomic History   • Marital status: /Civil Union     Spouse name: Not on file   • Number of children: Not on file   • Years of education: Not on file   • Highest education level: Not on file   Occupational History   • 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    Tobacco Use   • Smoking status: Never   • Smokeless tobacco: Never   Substance and Sexual Activity   • Alcohol use: No   • Drug use: No   • Sexual activity: Not on file   Other Topics Concern   • Not on file   Social History Narrative    Full-time employment    Lives with spouse      Social Determinants of Health     Financial Resource Strain: Not on file   Food Insecurity: Not on file   Transportation Needs: Not on file   Physical Activity: Not on file   Stress: Not on file   Social Connections: Not on file   Intimate Partner Violence: Not on file   Housing Stability: Not on file       Past Medical History    Past Medical History:   Diagnosis Date   • Chronic Rectal discharge 9/23/2019   • Hemorrhoids    • Renal calculi        Surgical History    Past Surgical History:   Procedure Laterality Date   • COLONOSCOPY  11/2019 Tubular adenoma   • AL LAP,APPENDECTOMY N/A 07/25/2016    Procedure: APPENDECTOMY LAPAROSCOPIC;  Surgeon: Brandy Carson MD;  [facility];  Service: General   • AL SURG DIAGNOSTIC EXAM, ANORECTAL N/A 11/20/2019    Procedure: EXAM UNDER ANESTHESIA (EUA) excision of mucosal ectropion;  Surgeon: Mary Arreola MD;  Location: [facility];  Service: Colorectal       Medications      Current Outpatient Medications:   •  Naproxen Sodium 220 MG CAPS, Take by mouth daily as needed, Disp: , Rfl:   •  tamsulosin (FLOMAX) 0 4 mg, TAKE ONE CAPSULE BY MOUTH AT BEDTIME, Disp: 30 capsule, Rfl: 1    Allergies    No Known Allergies    The following portions of the patient's history were reviewed and updated as appropriate: allergies, current medications, past family history, past medical history, past social history, past surgical history and problem list     Investigations    I personally reviewed the MRI results with the patient:    MRI of the cervical spine without contrast dated October 12th, 2022  Mild straightening of cervical lordosis  Degenerative changes throughout the cervical spine  Mild right foraminal stenosis at C4-5 secondary to degenerative changes  At C5-6 there is moderate to severe left foraminal stenosis secondary to degenerative changes while there is moderate to severe biforaminal stenosis at C6-7 secondary to degenerative changes  No other areas of significant neural compression  Spinal cord is normal in signal   Visualized posterior fossa structures are unremarkable  Physical Exam    Vitals:  Blood pressure 142/88, pulse 71, temperature 97 9 °F (36 6 °C), temperature source Temporal, height 5' 6" (1 676 m), weight 74 8 kg (165 lb), SpO2 96 %  ,Body mass index is 26 63 kg/m²  Physical Exam  Vitals reviewed  Constitutional:       General: He is not in acute distress  Eyes:      Extraocular Movements: Extraocular movements intact     Pulmonary:      Effort: Pulmonary effort is normal  No respiratory distress  Musculoskeletal:         General: No deformity  Cervical back: Normal range of motion  Skin:     General: Skin is warm and dry  Neurological:      Mental Status: He is alert and oriented to person, place, and time  Comments: 5/5 power in upper lower extremities  Mild wasting of triceps and left deltoid  No dysdiadochokinesia  Candelario's negative bilaterally  Reports diminished pinprick and light touch sensation over middle finger on the left hand  Walks with a steady gait  No clonus  Romberg negative   Psychiatric:         Mood and Affect: Mood normal          Behavior: Behavior normal        Neurologic Exam     Mental Status   Oriented to person, place, and time

## 2022-12-14 ENCOUNTER — HOSPITAL ENCOUNTER (OUTPATIENT)
Dept: RADIOLOGY | Facility: HOSPITAL | Age: 57
Discharge: HOME | End: 2022-12-14

## 2022-12-14 DIAGNOSIS — M50.30 DEGENERATIVE DISC DISEASE, CERVICAL: ICD-10-CM

## 2022-12-14 DIAGNOSIS — M54.12 CERVICAL RADICULOPATHY: ICD-10-CM

## 2023-01-04 ENCOUNTER — OFFICE VISIT (OUTPATIENT)
Dept: NEUROSURGERY | Facility: CLINIC | Age: 58
End: 2023-01-04

## 2023-01-04 VITALS
BODY MASS INDEX: 26.2 KG/M2 | HEIGHT: 66 IN | OXYGEN SATURATION: 96 % | SYSTOLIC BLOOD PRESSURE: 128 MMHG | TEMPERATURE: 97.9 F | DIASTOLIC BLOOD PRESSURE: 86 MMHG | WEIGHT: 163 LBS | HEART RATE: 80 BPM

## 2023-01-04 DIAGNOSIS — M50.30 DEGENERATIVE DISC DISEASE, CERVICAL: ICD-10-CM

## 2023-01-04 DIAGNOSIS — M54.12 CERVICAL RADICULOPATHY: Primary | ICD-10-CM

## 2023-01-04 NOTE — PROGRESS NOTES
Office Note - Neurosurgery   Aram Chance 56 y o  male MRN: [id number]      Assessment:    Patient is stable  37-year-old gentleman with cervical spondylosis and stenosis  Plain films demonstrate significant loss of disc height at C5-6 and C6-7 without clear motion at these levels  I encouraged him to consider course of membrane stabilizing agents as well  As such, if he is to consider surgical intervention, I would recommend a C5-6 and C6-7 ACDF  I reiterated that the goal of the surgery would be to improve the tingling and numbness into his arms  Surgery is intended to decompress neural structures and hopefully improve radicular pain symptoms and stabilize myelopathic symptoms  Weakness, numbness and axial skeletal pain are less likely to improve  The risks of surgery were described in detail  1  Risk of general anesthetic, with possible cardiac and respiratory complication  There is risk of infection and bleeding  2  Risk of neurological injury with new pain, weakness or numbness or difficulties with bowel and bladder function  The risk of CSF leak was described  3  Possible need for revision surgery in the future  Restriction of cervical range of motion  4  Swallowing difficulties and hoarseness of voice are quite common after surgery  These are generally self-limited  On occasion referral to an ENT surgeon and a feeding tube may be required  Expected postoperative course, including activity restrictions, expected pain and postoperative medication were reviewed  Patient provided verbal consent to surgical procedure and signed consent form: No, he will take some time to consider his options  He will contact this office any concerns arise or should he have additional questions  History, physical examination and diagnostic tests were reviewed and questions answered  Diagnosis, care plan and treatment options were discussed   The patient and spouse/SO understand instructions and will follow up as directed  Plan:    Follow-up: prn    Problem List Items Addressed This Visit        Nervous and Auditory    Cervical radiculopathy - Primary       Musculoskeletal and Integument    Degenerative disc disease, cervical       Subjective/Objective     Chief Complaint    Numbness and tingling in hands, neck pain, atrophy of left pectoral and triceps  HPI    61-year-old gentleman accompanied by his wife today  No significant change in his symptoms since his last visit with ongoing neck pain and numbness and tingling radiating into his hands  He does not have any progressive weakness or further atrophy on the left  Denies any pain, weakness or numbness in his legs or difficulties with bowel or bladder function  He has not tried membrane stabilizing agents  He presents today to review the results of plain films of the cervical spine and further surgical discussion  LUBNA CALVO personally reviewed and updated  Review of Systems   Constitutional: Negative  HENT: Negative  Eyes: Negative  Respiratory: Negative  Cardiovascular: Negative  Gastrointestinal: Negative  Endocrine: Negative  Genitourinary: Negative  Musculoskeletal: Positive for myalgias (occasional cramping in left arm) and neck pain (not constant, sometimes radiates into arms L>R)  Skin: Negative  Allergic/Immunologic: Negative  Neurological: Positive for dizziness (very occasional, usually with HA), weakness (left pectoral, and upper arm), numbness (left hand) and headaches (very occasional, manageable)  Hematological: Negative  Psychiatric/Behavioral: Positive for sleep disturbance (pain wakes him up, not sure if r/t neck)  All other systems reviewed and are negative        Family History    Family History   Problem Relation Age of Onset   • Cancer Paternal Grandfather         Unknown origin    • Other Family         Back problem    • Lung cancer Father    • Cancer Maternal Grandfather unknown origin       Social History    Social History     Socioeconomic History   • Marital status: /Civil Union     Spouse name: Not on file   • Number of children: Not on file   • Years of education: Not on file   • Highest education level: Not on file   Occupational History   • 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    Tobacco Use   • Smoking status: Never   • Smokeless tobacco: Never   Substance and Sexual Activity   • Alcohol use: No   • Drug use: No   • Sexual activity: Not on file   Other Topics Concern   • Not on file   Social History Narrative    Full-time employment    Lives with spouse      Social Determinants of Health     Financial Resource Strain: Not on file   Food Insecurity: Not on file   Transportation Needs: Not on file   Physical Activity: Not on file   Stress: Not on file   Social Connections: Not on file   Intimate Partner Violence: Not on file   Housing Stability: Not on file       Past Medical History    Past Medical History:   Diagnosis Date   • Chronic Rectal discharge 9/23/2019   • Hemorrhoids    • Renal calculi        Surgical History    Past Surgical History:   Procedure Laterality Date   • COLONOSCOPY  11/2019    Tubular adenoma   • FL ANRCT XM SURG REQ ANES GENERAL SPI/EDRL DX N/A 11/20/2019    Procedure: EXAM UNDER ANESTHESIA (EUA) excision of mucosal ectropion;  Surgeon: Raymon Fenton MD;  [facility];  Service: Colorectal   • [place] LAPAROSCOPIC APPENDECTOMY N/A 07/25/2016    Procedure: APPENDECTOMY LAPAROSCOPIC;  Surgeon: Ariane Sahu MD;  [facility];  Service: General       Medications      Current Outpatient Medications:   •  Naproxen Sodium 220 MG CAPS, Take by mouth daily as needed, Disp: , Rfl:   •  tamsulosin (FLOMAX) 0 4 mg, TAKE ONE CAPSULE BY MOUTH AT BEDTIME (Patient not taking: Reported on 1/4/2023), Disp: 30 capsule, Rfl: 1    Allergies    No Known Allergies    The following portions of the patient's history were reviewed and updated as appropriate: allergies, current medications, past family history, past medical history, past social history, past surgical history and problem list     Investigations    I personally reviewed the XRAY results with the patient:    Upright flexion-extension films of the cervical spine dated December 14, 2022  Mild straightening of cervical doses  Degenerative disc disease at C5-6 and C6-7 with loss of disc height and minimal motion on flexion extension  Milder degenerative changes throughout the remainder of the cervical spine without gross instability  Physical Exam    Vitals:  Blood pressure 128/86, pulse 80, temperature 97 9 °F (36 6 °C), temperature source Temporal, height 5' 6" (1 676 m), weight 73 9 kg (163 lb), SpO2 96 %  ,Body mass index is 26 31 kg/m²  Physical Exam  Vitals reviewed  Constitutional:       General: He is not in acute distress  Pulmonary:      Effort: Pulmonary effort is normal  No respiratory distress  Skin:     General: Skin is warm and dry  Neurological:      Mental Status: He is alert and oriented to person, place, and time  Sensory: No sensory deficit  Motor: No weakness  Coordination: Coordination normal       Gait: Gait normal    Psychiatric:         Mood and Affect: Mood normal          Behavior: Behavior normal        Neurologic Exam     Mental Status   Oriented to person, place, and time

## 2023-01-29 DIAGNOSIS — R39.198 URINARY STREAM SLOWING: ICD-10-CM

## 2023-01-29 RX ORDER — TAMSULOSIN HYDROCHLORIDE 0.4 MG/1
CAPSULE ORAL
Qty: 30 CAPSULE | Refills: 1 | Status: SHIPPED | OUTPATIENT
Start: 2023-01-29

## 2023-02-05 PROBLEM — M46.1 SACROILIAC INFLAMMATION (HCC): Status: RESOLVED | Noted: 2022-07-18 | Resolved: 2023-02-05

## 2023-02-06 ENCOUNTER — OFFICE VISIT (OUTPATIENT)
Dept: FAMILY MEDICINE CLINIC | Facility: CLINIC | Age: 58
End: 2023-02-06

## 2023-02-06 VITALS
BODY MASS INDEX: 26.36 KG/M2 | OXYGEN SATURATION: 98 % | TEMPERATURE: 97.5 F | HEART RATE: 70 BPM | DIASTOLIC BLOOD PRESSURE: 76 MMHG | WEIGHT: 164 LBS | HEIGHT: 66 IN | SYSTOLIC BLOOD PRESSURE: 114 MMHG

## 2023-02-06 DIAGNOSIS — R39.198 URINARY STREAM SLOWING: ICD-10-CM

## 2023-02-06 DIAGNOSIS — M54.12 CERVICAL RADICULOPATHY: Primary | ICD-10-CM

## 2023-02-06 DIAGNOSIS — R05.3 PERSISTENT DRY COUGH: ICD-10-CM

## 2023-02-06 RX ORDER — GABAPENTIN 100 MG/1
CAPSULE ORAL
Qty: 90 CAPSULE | Refills: 1 | Status: SHIPPED | OUTPATIENT
Start: 2023-02-06

## 2023-02-06 NOTE — PATIENT INSTRUCTIONS
Oct 2022 MRI Cspine =  IMPRESSION:  Spondylotic changes of the cervical spine as detailed above including multifactorial severe left C5-6 foraminal stenosis and moderate to severe bilateral C6-7 foraminal stenosis  Correlate clinically for exiting left C6 and bilateral exiting   C7 nerve roots, respectively  _______________________________________________________________________________________      Reviewed recent visit with Neurosurgeon, he is trying to hold off on fusion, does have paresthesias in the arms, neck pain fluctuates  He will try Gabapentin 100 mg at bedtime, can increase weekly by 100 mg to reach 300 mg at bedtime, update us in 3 to 4 weeks  Watch for mental fogginess in the morning  Continue with exercise as tolerated    He is doing well with Tamsulosin, urinary stream improved, continue as is  Does have ongoing dry cough, does use humidity at home, no RAD history  Can use Ocean nasal saline as needed, if not improving can use Flonase nasal 2 sprays each nostril daily for few weeks as a trial, lungs are clear, I held off on ordering chest x-ray  Update us 1 to 2 months      Back in July PSA was fine at 0 9, CBC, CMP were okay other than borderline glucose at 101, cholesterol was 195 with HDL 56,

## 2023-02-06 NOTE — PROGRESS NOTES
FAMILY PRACTICE OFFICE VISIT  [facility]  [facility]  [address]  [address], [place], [address]      NAME: Shubham Mujica  AGE: 56 y o  SEX: male  : 1965   MRN: [id number]    DATE: 2023  TIME: 8:36 AM    Assessment and Plan     Problem List Items Addressed This Visit     Urinary stream slowing    Cervical radiculopathy - Primary    Relevant Medications    gabapentin (Neurontin) 100 mg capsule   Other Visit Diagnoses     Persistent dry cough              Patient Instructions   Oct 2022 MRI Cspine =  IMPRESSION:  Spondylotic changes of the cervical spine as detailed above including multifactorial severe left C5-6 foraminal stenosis and moderate to severe bilateral C6-7 foraminal stenosis  Correlate clinically for exiting left C6 and bilateral exiting   C7 nerve roots, respectively  _______________________________________________________________________________________      Reviewed recent visit with Neurosurgeon, he is trying to hold off on fusion, does have paresthesias in the arms, neck pain fluctuates  He will try Gabapentin 100 mg at bedtime, can increase weekly by 100 mg to reach 300 mg at bedtime, update us in 3 to 4 weeks  Watch for mental fogginess in the morning  Continue with exercise as tolerated    He is doing well with Tamsulosin, urinary stream improved, continue as is  Does have ongoing dry cough, does use humidity at home, no RAD history  Can use Ocean nasal saline as needed, if not improving can use Flonase nasal 2 sprays each nostril daily for few weeks as a trial, lungs are clear, I held off on ordering chest x-ray  Update us 1 to 2 months      Back in July PSA was fine at 0 9, CBC, CMP were okay other than borderline glucose at 101, cholesterol was 195 with HDL 56,          Chief Complaint     Chief Complaint   Patient presents with   • Follow-up       History of Present Illness   Shubham Mujica is a 56 y o -year-old male who saw neurosurgeon recently, is trying to hold off on cervical spine fusion  Does continue to work at physical job, does note paresthesias, numbness left arm, atrophy left chest as before  Neurosurgeon advised trying gabapentin, he is in to discuss this  Review of Systems   Review of Systems   Constitutional: Negative for appetite change, fatigue, fever and unexpected weight change  HENT: Negative for sore throat and trouble swallowing  Respiratory: Positive for cough (dry cough at times for months, no sig reflux, nasal congestion in AM   No cough or wheeze -  no exertional issues)  Negative for chest tightness, shortness of breath and wheezing  Cardiovascular: Negative for chest pain, palpitations and leg swelling  Gastrointestinal: Negative for abdominal pain, blood in stool, nausea and vomiting  No acid reflux     No change in bowel   Genitourinary: Negative for dysuria and hematuria  Improved with tamsulosin   Musculoskeletal: Positive for neck pain (pain level '0' at rest, can be '10' at times w 'electric shock for few secs at times w movement) and neck stiffness  Neurological: Negative for dizziness, syncope, light-headedness and headaches  Psychiatric/Behavioral: Negative for behavioral problems and confusion  Active Problem List     Patient Active Problem List   Diagnosis   • Cervical radiculopathy   • Urinary stream slowing   • Degenerative disc disease, cervical   • Foraminal stenosis of cervical region       Past Medical History:  Reviewed    Past Surgical History:  Reviewed    Family History:  Reviewed    Social History:  Reviewed    Objective     Vitals:    02/06/23 0742   BP: 114/76   BP Location: Left arm   Patient Position: Sitting   Cuff Size: Large   Pulse: 70   Temp: 97 5 °F (36 4 °C)   SpO2: 98%   Weight: 74 4 kg (164 lb)   Height: 5' 6" (1 676 m)     Body mass index is 26 47 kg/m²      BP Readings from Last 3 Encounters:   02/06/23 114/76   01/04/23 128/86   11/30/22 142/88       Wt Readings from Last 3 Encounters:   02/06/23 74 4 kg (164 lb)   01/04/23 73 9 kg (163 lb)   11/30/22 74 8 kg (165 lb)       Physical Exam  Constitutional:       Appearance: Normal appearance  He is not ill-appearing  HENT:      Right Ear: Tympanic membrane normal       Left Ear: Tympanic membrane normal       Mouth/Throat:      Pharynx: Oropharynx is clear  Eyes:      General: No scleral icterus  Neck:      Vascular: No carotid bruit  Cardiovascular:      Rate and Rhythm: Normal rate and regular rhythm  Heart sounds: Normal heart sounds  No murmur heard  Pulmonary:      Effort: Pulmonary effort is normal  No respiratory distress  Breath sounds: Normal breath sounds  No wheezing, rhonchi or rales  Musculoskeletal:      Right lower leg: No edema  Left lower leg: No edema  Lymphadenopathy:      Cervical: No cervical adenopathy  Skin:     Coloration: Skin is not jaundiced  Neurological:      Mental Status: He is alert  Mental status is at baseline  ALLERGIES:  No Known Allergies    Current Medications     Current Outpatient Medications   Medication Sig Dispense Refill   • gabapentin (Neurontin) 100 mg capsule Use 1 pill at bedtime for 1 week, can increase to 2 pills after 1 week, 3 pills at bedtime after 2 weeks 90 capsule 1   • Naproxen Sodium 220 MG CAPS Take by mouth daily as needed     • tamsulosin (FLOMAX) 0 4 mg TAKE ONE CAPSULE BY MOUTH AT BEDTIME 30 capsule 1     No current facility-administered medications for this visit  No orders of the defined types were placed in this encounter          Zula Soulier, DO

## 2023-03-29 DIAGNOSIS — R39.198 URINARY STREAM SLOWING: ICD-10-CM

## 2023-03-29 RX ORDER — TAMSULOSIN HYDROCHLORIDE 0.4 MG/1
0.4 CAPSULE ORAL
Qty: 30 CAPSULE | Refills: 1 | Status: SHIPPED | OUTPATIENT
Start: 2023-03-29

## 2023-05-30 DIAGNOSIS — R39.198 URINARY STREAM SLOWING: ICD-10-CM

## 2023-05-31 RX ORDER — TAMSULOSIN HYDROCHLORIDE 0.4 MG/1
0.4 CAPSULE ORAL
Qty: 30 CAPSULE | Refills: 0 | Status: SHIPPED | OUTPATIENT
Start: 2023-05-31

## 2023-06-30 DIAGNOSIS — R39.198 URINARY STREAM SLOWING: ICD-10-CM

## 2023-06-30 RX ORDER — TAMSULOSIN HYDROCHLORIDE 0.4 MG/1
0.4 CAPSULE ORAL
Qty: 30 CAPSULE | Refills: 0 | Status: SHIPPED | OUTPATIENT
Start: 2023-06-30

## 2023-07-29 DIAGNOSIS — R39.198 URINARY STREAM SLOWING: ICD-10-CM

## 2023-07-29 RX ORDER — TAMSULOSIN HYDROCHLORIDE 0.4 MG/1
0.4 CAPSULE ORAL
Qty: 30 CAPSULE | Refills: 0 | Status: SHIPPED | OUTPATIENT
Start: 2023-07-29

## 2023-08-21 DIAGNOSIS — R39.198 URINARY STREAM SLOWING: ICD-10-CM

## 2023-08-21 RX ORDER — TAMSULOSIN HYDROCHLORIDE 0.4 MG/1
0.4 CAPSULE ORAL
Qty: 30 CAPSULE | Refills: 0 | Status: SHIPPED | OUTPATIENT
Start: 2023-08-21 | End: 2023-08-22 | Stop reason: SDUPTHER

## 2023-08-22 RX ORDER — TAMSULOSIN HYDROCHLORIDE 0.4 MG/1
0.4 CAPSULE ORAL
Qty: 90 CAPSULE | Refills: 1 | Status: SHIPPED | OUTPATIENT
Start: 2023-08-22

## (undated) DEVICE — GAUZE SPONGES,16 PLY: Brand: CURITY

## (undated) DEVICE — GOWN ,SIRUS ,NONREINFORCED 4XL: Brand: MEDLINE

## (undated) DEVICE — SUT VICRYL 0 UR-6 27 IN J603H

## (undated) DEVICE — PLUMEPEN PRO 10FT

## (undated) DEVICE — LUBRICANT SURGILUBE TUBE 4 OZ  FLIP TOP

## (undated) DEVICE — SPONGE STICK WITH PVP-I: Brand: KENDALL

## (undated) DEVICE — PAD GROUNDING ADULT

## (undated) DEVICE — 3M™ DURAPORE™ SURGICAL TAPE 1538-3, 3 INCH X 10 YARD (7,5CM X 9,1M), 4 ROLLS/BOX: Brand: 3M™ DURAPORE™

## (undated) DEVICE — GLOVE INDICATOR PI UNDERGLOVE SZ 7.5 BLUE

## (undated) DEVICE — BETHLEHEM UNIVERSAL MINOR GEN: Brand: CARDINAL HEALTH

## (undated) DEVICE — BASIC SINGLE BASIN 2-LF: Brand: MEDLINE INDUSTRIES, INC.

## (undated) DEVICE — 3000CC GUARDIAN II: Brand: GUARDIAN

## (undated) DEVICE — INTENDED FOR TISSUE SEPARATION, AND OTHER PROCEDURES THAT REQUIRE A SHARP SURGICAL BLADE TO PUNCTURE OR CUT.: Brand: BARD-PARKER SAFETY BLADES SIZE 15, STERILE

## (undated) DEVICE — GELFOAM 100

## (undated) DEVICE — TUBING SUCTION 5MM X 12 FT

## (undated) DEVICE — GLOVE SRG BIOGEL 7.5

## (undated) DEVICE — SUT VICRYL 3-0 SH 27 IN J416H

## (undated) DEVICE — DISPOSABLE BRIEF/UNDERWEAR

## (undated) DEVICE — ALL PURPOSE SPONGES,NONWOVEN, 4 PLY: Brand: CURITY

## (undated) DEVICE — POOLE SUCTION HANDLE: Brand: CARDINAL HEALTH

## (undated) DEVICE — NEEDLE HYPO 22G X 1-1/2 IN